# Patient Record
Sex: FEMALE | Race: WHITE | NOT HISPANIC OR LATINO | Employment: OTHER | URBAN - METROPOLITAN AREA
[De-identification: names, ages, dates, MRNs, and addresses within clinical notes are randomized per-mention and may not be internally consistent; named-entity substitution may affect disease eponyms.]

---

## 2023-09-05 PROBLEM — R26.9 GAIT ABNORMALITY: Status: ACTIVE | Noted: 2023-09-05

## 2023-09-05 PROBLEM — R29.898 DECREASED STRENGTH INVOLVING KNEE JOINT: Status: ACTIVE | Noted: 2023-09-05

## 2023-09-05 PROBLEM — E78.5 HYPERLIPEMIA: Status: ACTIVE | Noted: 2023-09-05

## 2023-09-05 PROBLEM — E03.9 HYPOTHYROIDISM, ADULT: Status: ACTIVE | Noted: 2023-09-05

## 2023-09-05 PROBLEM — D35.2 ADENOMA OF PITUITARY (MULTI): Status: ACTIVE | Noted: 2023-09-05

## 2023-09-05 PROBLEM — R79.89 ELEVATED CORTISOL LEVEL: Status: ACTIVE | Noted: 2023-09-05

## 2023-09-05 RX ORDER — CHOLECALCIFEROL (VITAMIN D3) 50 MCG
5000 TABLET ORAL DAILY
COMMUNITY

## 2023-09-05 RX ORDER — LEVOTHYROXINE SODIUM 25 UG/1
2 TABLET ORAL DAILY
COMMUNITY

## 2023-09-05 RX ORDER — PANTOPRAZOLE SODIUM 40 MG/1
TABLET, DELAYED RELEASE ORAL
COMMUNITY
End: 2023-10-16 | Stop reason: ENTERED-IN-ERROR

## 2023-09-05 RX ORDER — IPRATROPIUM BROMIDE AND ALBUTEROL SULFATE 2.5; .5 MG/3ML; MG/3ML
3 SOLUTION RESPIRATORY (INHALATION) 4 TIMES DAILY PRN
COMMUNITY

## 2023-09-05 RX ORDER — ALBUTEROL SULFATE 90 UG/1
2 AEROSOL, METERED RESPIRATORY (INHALATION) EVERY 4 HOURS PRN
COMMUNITY

## 2023-09-05 RX ORDER — FLUTICASONE FUROATE, UMECLIDINIUM BROMIDE AND VILANTEROL TRIFENATATE 200; 62.5; 25 UG/1; UG/1; UG/1
1 POWDER RESPIRATORY (INHALATION) DAILY
COMMUNITY

## 2023-09-05 RX ORDER — ATORVASTATIN CALCIUM 10 MG/1
1 TABLET, FILM COATED ORAL 2 TIMES WEEKLY
COMMUNITY

## 2023-09-05 RX ORDER — TIOTROPIUM BROMIDE 18 UG/1
1 CAPSULE ORAL; RESPIRATORY (INHALATION) DAILY
COMMUNITY
End: 2023-10-16 | Stop reason: ENTERED-IN-ERROR

## 2023-09-05 RX ORDER — SUMATRIPTAN SUCCINATE 100 MG/1
1 TABLET ORAL DAILY PRN
COMMUNITY
End: 2023-10-16 | Stop reason: ENTERED-IN-ERROR

## 2023-09-05 RX ORDER — SULFAMETHOXAZOLE AND TRIMETHOPRIM 800; 160 MG/1; MG/1
1 TABLET ORAL EVERY 12 HOURS
COMMUNITY
Start: 2020-08-01 | End: 2023-10-16 | Stop reason: ENTERED-IN-ERROR

## 2023-09-05 RX ORDER — PROPRANOLOL/HYDROCHLOROTHIAZID 40 MG-25MG
1 TABLET ORAL DAILY
COMMUNITY
End: 2023-10-16 | Stop reason: ENTERED-IN-ERROR

## 2023-09-05 RX ORDER — FLUTICASONE PROPIONATE AND SALMETEROL 250; 50 UG/1; UG/1
1 POWDER RESPIRATORY (INHALATION) 2 TIMES DAILY
COMMUNITY
End: 2023-10-16 | Stop reason: ENTERED-IN-ERROR

## 2023-09-05 RX ORDER — FUROSEMIDE 40 MG/1
TABLET ORAL
COMMUNITY
End: 2023-10-16 | Stop reason: ENTERED-IN-ERROR

## 2023-09-05 RX ORDER — IBUPROFEN 800 MG/1
1 TABLET ORAL 2 TIMES DAILY PRN
COMMUNITY
End: 2023-10-16 | Stop reason: ENTERED-IN-ERROR

## 2023-09-05 RX ORDER — MONTELUKAST SODIUM 10 MG/1
10 TABLET ORAL
COMMUNITY

## 2023-10-11 ENCOUNTER — OFFICE VISIT (OUTPATIENT)
Dept: ENDOCRINOLOGY | Facility: CLINIC | Age: 78
End: 2023-10-11
Payer: MEDICARE

## 2023-10-11 ENCOUNTER — LAB (OUTPATIENT)
Dept: LAB | Facility: LAB | Age: 78
End: 2023-10-11
Payer: MEDICARE

## 2023-10-11 VITALS
BODY MASS INDEX: 34.66 KG/M2 | HEART RATE: 67 BPM | HEIGHT: 65 IN | SYSTOLIC BLOOD PRESSURE: 120 MMHG | DIASTOLIC BLOOD PRESSURE: 80 MMHG | WEIGHT: 208 LBS

## 2023-10-11 DIAGNOSIS — E03.9 HYPOTHYROIDISM, ADULT: Primary | ICD-10-CM

## 2023-10-11 DIAGNOSIS — D35.2 ADENOMA OF PITUITARY (MULTI): ICD-10-CM

## 2023-10-11 DIAGNOSIS — E03.9 HYPOTHYROIDISM, ADULT: ICD-10-CM

## 2023-10-11 LAB
CORTIS AM PEAK SERPL-MSCNC: 19 UG/DL (ref 5–20)
ESTRADIOL SERPL-MCNC: <19 PG/ML
FSH SERPL-ACNC: 55.2 IU/L
LH SERPL-ACNC: 23.5 IU/L
PROLACTIN SERPL-MCNC: 5.3 UG/L (ref 3–20)
T4 FREE SERPL-MCNC: 1.03 NG/DL (ref 0.61–1.12)
TSH SERPL-ACNC: 3.56 MIU/L (ref 0.44–3.98)

## 2023-10-11 PROCEDURE — 83001 ASSAY OF GONADOTROPIN (FSH): CPT

## 2023-10-11 PROCEDURE — 99214 OFFICE O/P EST MOD 30 MIN: CPT | Performed by: INTERNAL MEDICINE

## 2023-10-11 PROCEDURE — 36415 COLL VENOUS BLD VENIPUNCTURE: CPT

## 2023-10-11 PROCEDURE — 84443 ASSAY THYROID STIM HORMONE: CPT

## 2023-10-11 PROCEDURE — 84439 ASSAY OF FREE THYROXINE: CPT

## 2023-10-11 PROCEDURE — 82024 ASSAY OF ACTH: CPT

## 2023-10-11 PROCEDURE — 1159F MED LIST DOCD IN RCRD: CPT | Performed by: INTERNAL MEDICINE

## 2023-10-11 PROCEDURE — 84305 ASSAY OF SOMATOMEDIN: CPT

## 2023-10-11 PROCEDURE — 82670 ASSAY OF TOTAL ESTRADIOL: CPT

## 2023-10-11 PROCEDURE — 83002 ASSAY OF GONADOTROPIN (LH): CPT

## 2023-10-11 PROCEDURE — 82533 TOTAL CORTISOL: CPT

## 2023-10-11 PROCEDURE — 1126F AMNT PAIN NOTED NONE PRSNT: CPT | Performed by: INTERNAL MEDICINE

## 2023-10-11 PROCEDURE — 84146 ASSAY OF PROLACTIN: CPT

## 2023-10-11 ASSESSMENT — ENCOUNTER SYMPTOMS
BACK PAIN: 1
ARTHRALGIAS: 1
TREMORS: 0
UNEXPECTED WEIGHT CHANGE: 1
CONSTIPATION: 0
HEADACHES: 0

## 2023-10-11 NOTE — PROGRESS NOTES
Subjective   Ms. Alvarez is a 77-year-old female who presents for follow up for a pituitary adenoma as well as hypothyroidism.     The patient states that she had a pituitary adenoma which was followed for 10 to 20 years. Her previous endocrinologist was Dr. Leblanc in [place].     Her last MRI from March 15, 2019 did not reveal any pituitary adenomas. She previously reports that she had a 6 mm lesion on a previous MRI of the brain.     She did recall seeing a neurologist for which no intervention was planned. She recalls that she was told that she had acromegaly/or was suspected to have acromegaly based on the appearance of her hands.     The patient previously had hypothyroidism and she states that she never felt fatigued even after working 2 jobs. She states that she had radioactive iodine treatment and recalls that in 2001 she developed hair loss, weight gain and fatigue.     Family history:  Sister - total thyroidectomy   Daughter - hypothyroidism      Current Regimen  Levothyroxine 25mcg po daily 4 days per week  Levothyroxine 50mcg po daily 3 days per week      Thyroid function tests were obtained on September 30, 2022 which revealed a TSH of 3.74 and a free T4 of 1.41     Symptoms are as listed below:  Energy - good   Sleep - disrupted by GERD occasionally   Temperature intolerances -  denies  Bowel movements - regular; once daily   Skin changes -turns purple in the cold   Hair changes -hospitalized for three weeks in December 2021 for pneumonia due to COPD; experienced tinea effluvium; no changes currently   Tremors - denies  Palpitations - denies   Weight change - gained; no longer exercises as facility does not take Silver Sneakers and the [place] facility is too busy with students    Headaches - denies  Vision changes- denies      She had back surgery in 2016; still has radiculopathy  She underwent a hysterectomy at age 30  She underwent bilateral oophorectomy in 2016     She has had no major changes to her  "health since her last appointment.  Her son  one month ago unexpedectedly in his sleep.        Review of Systems   Constitutional:  Positive for unexpected weight change.   Respiratory:  Apnea: Gained 1 pound compared to one year ago.    Gastrointestinal:  Negative for constipation.   Musculoskeletal:  Positive for arthralgias (Knee pain) and back pain.   Neurological:  Negative for tremors and headaches.         Objective   /80 (BP Location: Left arm, Patient Position: Sitting, BP Cuff Size: Adult)   Pulse 67   Ht 1.638 m (5' 4.5\")   Wt 94.3 kg (208 lb)   BMI 35.15 kg/m²    Physical Exam  Constitutional:       General: She is not in acute distress.     Appearance: Normal appearance. She is obese.   HENT:      Head: Normocephalic and atraumatic.      Nose: Nose normal.      Mouth/Throat:      Mouth: Mucous membranes are moist.   Eyes:      Extraocular Movements: Extraocular movements intact.   Neck:      Thyroid: No thyroid mass, thyromegaly or thyroid tenderness.   Cardiovascular:      Rate and Rhythm: Normal rate and regular rhythm.   Pulmonary:      Effort: Pulmonary effort is normal.      Breath sounds: Normal breath sounds.   Musculoskeletal:         General: Normal range of motion.   Skin:     General: Skin is warm.   Neurological:      Mental Status: She is alert and oriented to person, place, and time.   Psychiatric:         Mood and Affect: Mood normal.         Lab Results   Component Value Date    TSH 3.77 10/11/2022    FREET4 1.06 10/11/2022       Assessment/Plan   Hypothyroidism, adult  To obtain thyroid levels   To continue Levothyroxine 25mcg po daily 4 days per week  To continue Levothyroxine 50mcg po daily 3 days per week   Take levothyroxine on an empty stomach with water alone, 30-60 minutes before eating or taking other medications, 4 hours before any calcium or iron supplement.         Adenoma of pituitary (CMS/[facility])  To obtain pituitary panel   Most recent MRI from 2019 did not " reveal any abnormalities     For follow up in 1 year

## 2023-10-11 NOTE — ASSESSMENT & PLAN NOTE
To obtain thyroid levels   To continue Levothyroxine 25mcg po daily 4 days per week  To continue Levothyroxine 50mcg po daily 3 days per week   Take levothyroxine on an empty stomach with water alone, 30-60 minutes before eating or taking other medications, 4 hours before any calcium or iron supplement.

## 2023-10-13 LAB — ACTH PLAS-MCNC: 9 PG/ML (ref 7.2–63.3)

## 2023-10-14 LAB
IGF-I SERPL-MCNC: 210 NG/ML (ref 18–206)
IGF-I Z-SCORE SERPL: 1.8

## 2023-10-16 ENCOUNTER — APPOINTMENT (OUTPATIENT)
Dept: RADIOLOGY | Facility: HOSPITAL | Age: 78
DRG: 194 | End: 2023-10-16
Payer: MEDICARE

## 2023-10-16 ENCOUNTER — HOSPITAL ENCOUNTER (OUTPATIENT)
Facility: HOSPITAL | Age: 78
Setting detail: OBSERVATION
Discharge: HOME | DRG: 194 | End: 2023-10-18
Attending: EMERGENCY MEDICINE | Admitting: INTERNAL MEDICINE
Payer: MEDICARE

## 2023-10-16 ENCOUNTER — TELEPHONE (OUTPATIENT)
Dept: ENDOCRINOLOGY | Facility: CLINIC | Age: 78
End: 2023-10-16
Payer: MEDICARE

## 2023-10-16 DIAGNOSIS — J18.9 COMMUNITY ACQUIRED PNEUMONIA OF LEFT LOWER LOBE OF LUNG: ICD-10-CM

## 2023-10-16 DIAGNOSIS — J43.9 PULMONARY EMPHYSEMA, UNSPECIFIED EMPHYSEMA TYPE (MULTI): ICD-10-CM

## 2023-10-16 DIAGNOSIS — J15.9 COMMUNITY ACQUIRED BACTERIAL PNEUMONIA: Primary | ICD-10-CM

## 2023-10-16 LAB
ABO GROUP (TYPE) IN BLOOD: NORMAL
ALBUMIN SERPL BCP-MCNC: 3.6 G/DL (ref 3.4–5)
ALP SERPL-CCNC: 115 U/L (ref 33–136)
ALT SERPL W P-5'-P-CCNC: 19 U/L (ref 7–45)
ANION GAP BLDV CALCULATED.4IONS-SCNC: 11 MMOL/L (ref 10–25)
ANION GAP SERPL CALC-SCNC: 14 MMOL/L (ref 10–20)
ANTIBODY SCREEN: NORMAL
AST SERPL W P-5'-P-CCNC: 30 U/L (ref 9–39)
BASE EXCESS BLDV CALC-SCNC: 1.3 MMOL/L (ref -2–3)
BASOPHILS # BLD AUTO: 0.03 X10*3/UL (ref 0–0.1)
BASOPHILS NFR BLD AUTO: 0.3 %
BILIRUB SERPL-MCNC: 0.7 MG/DL (ref 0–1.2)
BNP SERPL-MCNC: 59 PG/ML (ref 0–99)
BODY TEMPERATURE: 37 DEGREES CELSIUS
BUN SERPL-MCNC: 15 MG/DL (ref 6–23)
CA-I BLDV-SCNC: 1.15 MMOL/L (ref 1.1–1.33)
CALCIUM SERPL-MCNC: 9.1 MG/DL (ref 8.6–10.3)
CARDIAC TROPONIN I PNL SERPL HS: 10 NG/L (ref 0–13)
CARDIAC TROPONIN I PNL SERPL HS: 9 NG/L (ref 0–13)
CHLORIDE BLDV-SCNC: 104 MMOL/L (ref 98–107)
CHLORIDE SERPL-SCNC: 105 MMOL/L (ref 98–107)
CO2 SERPL-SCNC: 24 MMOL/L (ref 21–32)
CREAT SERPL-MCNC: 0.89 MG/DL (ref 0.5–1.05)
D DIMER PPP FEU-MCNC: 1646 NG/ML FEU
EOSINOPHIL # BLD AUTO: 0.08 X10*3/UL (ref 0–0.4)
EOSINOPHIL NFR BLD AUTO: 0.9 %
ERYTHROCYTE [DISTWIDTH] IN BLOOD BY AUTOMATED COUNT: 13.2 % (ref 11.5–14.5)
GFR SERPL CREATININE-BSD FRML MDRD: 66 ML/MIN/1.73M*2
GLUCOSE BLDV-MCNC: 146 MG/DL (ref 74–99)
GLUCOSE SERPL-MCNC: 137 MG/DL (ref 74–99)
HCO3 BLDV-SCNC: 26.6 MMOL/L (ref 22–26)
HCT VFR BLD AUTO: 37.5 % (ref 36–46)
HCT VFR BLD EST: 37 % (ref 36–46)
HGB BLD-MCNC: 12.8 G/DL (ref 12–16)
HGB BLDV-MCNC: 12.4 G/DL (ref 12–16)
IMM GRANULOCYTES # BLD AUTO: 0.07 X10*3/UL (ref 0–0.5)
IMM GRANULOCYTES NFR BLD AUTO: 0.8 % (ref 0–0.9)
INHALED O2 CONCENTRATION: 21 %
INR PPP: 1.1 (ref 0.9–1.1)
LACTATE BLDV-SCNC: 1.3 MMOL/L (ref 0.4–2)
LACTATE SERPL-SCNC: 0.7 MMOL/L (ref 0.4–2)
LYMPHOCYTES # BLD AUTO: 0.95 X10*3/UL (ref 0.8–3)
LYMPHOCYTES NFR BLD AUTO: 10.7 %
MAGNESIUM SERPL-MCNC: 2.08 MG/DL (ref 1.6–2.4)
MCH RBC QN AUTO: 31.8 PG (ref 26–34)
MCHC RBC AUTO-ENTMCNC: 34.1 G/DL (ref 32–36)
MCV RBC AUTO: 93 FL (ref 80–100)
MONOCYTES # BLD AUTO: 0.8 X10*3/UL (ref 0.05–0.8)
MONOCYTES NFR BLD AUTO: 9 %
NEUTROPHILS # BLD AUTO: 6.93 X10*3/UL (ref 1.6–5.5)
NEUTROPHILS NFR BLD AUTO: 78.3 %
NRBC BLD-RTO: 0 /100 WBCS (ref 0–0)
OXYHGB MFR BLDV: 47.6 % (ref 45–75)
PCO2 BLDV: 44 MM HG (ref 41–51)
PH BLDV: 7.39 PH (ref 7.33–7.43)
PLATELET # BLD AUTO: 268 X10*3/UL (ref 150–450)
PMV BLD AUTO: 11 FL (ref 7.5–11.5)
PO2 BLDV: 33 MM HG (ref 35–45)
POTASSIUM BLDV-SCNC: 3.9 MMOL/L (ref 3.5–5.3)
POTASSIUM SERPL-SCNC: 5.2 MMOL/L (ref 3.5–5.3)
PROT SERPL-MCNC: 6.8 G/DL (ref 6.4–8.2)
PROTHROMBIN TIME: 12.2 SECONDS (ref 9.8–12.8)
RBC # BLD AUTO: 4.02 X10*6/UL (ref 4–5.2)
RH FACTOR (ANTIGEN D): NORMAL
SAO2 % BLDV: 48 % (ref 45–75)
SODIUM BLDV-SCNC: 138 MMOL/L (ref 136–145)
SODIUM SERPL-SCNC: 138 MMOL/L (ref 136–145)
WBC # BLD AUTO: 8.9 X10*3/UL (ref 4.4–11.3)

## 2023-10-16 PROCEDURE — 86900 BLOOD TYPING SEROLOGIC ABO: CPT | Performed by: INTERNAL MEDICINE

## 2023-10-16 PROCEDURE — 2550000001 HC RX 255 CONTRASTS: Performed by: EMERGENCY MEDICINE

## 2023-10-16 PROCEDURE — 2500000001 HC RX 250 WO HCPCS SELF ADMINISTERED DRUGS (ALT 637 FOR MEDICARE OP): Performed by: PHYSICIAN ASSISTANT

## 2023-10-16 PROCEDURE — 71275 CT ANGIOGRAPHY CHEST: CPT | Mod: MG

## 2023-10-16 PROCEDURE — 83880 ASSAY OF NATRIURETIC PEPTIDE: CPT | Performed by: EMERGENCY MEDICINE

## 2023-10-16 PROCEDURE — 99222 1ST HOSP IP/OBS MODERATE 55: CPT | Performed by: INTERNAL MEDICINE

## 2023-10-16 PROCEDURE — 85379 FIBRIN DEGRADATION QUANT: CPT | Performed by: EMERGENCY MEDICINE

## 2023-10-16 PROCEDURE — 2500000004 HC RX 250 GENERAL PHARMACY W/ HCPCS (ALT 636 FOR OP/ED): Performed by: EMERGENCY MEDICINE

## 2023-10-16 PROCEDURE — 86850 RBC ANTIBODY SCREEN: CPT | Performed by: INTERNAL MEDICINE

## 2023-10-16 PROCEDURE — G0378 HOSPITAL OBSERVATION PER HR: HCPCS

## 2023-10-16 PROCEDURE — 84484 ASSAY OF TROPONIN QUANT: CPT | Performed by: EMERGENCY MEDICINE

## 2023-10-16 PROCEDURE — 1210000001 HC SEMI-PRIVATE ROOM DAILY

## 2023-10-16 PROCEDURE — 84132 ASSAY OF SERUM POTASSIUM: CPT | Mod: 59 | Performed by: EMERGENCY MEDICINE

## 2023-10-16 PROCEDURE — 83605 ASSAY OF LACTIC ACID: CPT | Mod: 59 | Performed by: EMERGENCY MEDICINE

## 2023-10-16 PROCEDURE — 2500000001 HC RX 250 WO HCPCS SELF ADMINISTERED DRUGS (ALT 637 FOR MEDICARE OP): Performed by: INTERNAL MEDICINE

## 2023-10-16 PROCEDURE — 87040 BLOOD CULTURE FOR BACTERIA: CPT | Mod: 59,CMCLAB,PORLAB | Performed by: EMERGENCY MEDICINE

## 2023-10-16 PROCEDURE — 85018 HEMOGLOBIN: CPT | Mod: 59 | Performed by: EMERGENCY MEDICINE

## 2023-10-16 PROCEDURE — 71045 X-RAY EXAM CHEST 1 VIEW: CPT | Mod: FY,FR

## 2023-10-16 PROCEDURE — 71275 CT ANGIOGRAPHY CHEST: CPT | Mod: FOREIGN READ | Performed by: RADIOLOGY

## 2023-10-16 PROCEDURE — 85025 COMPLETE CBC W/AUTO DIFF WBC: CPT | Performed by: EMERGENCY MEDICINE

## 2023-10-16 PROCEDURE — 82947 ASSAY GLUCOSE BLOOD QUANT: CPT | Mod: 59 | Performed by: EMERGENCY MEDICINE

## 2023-10-16 PROCEDURE — 85610 PROTHROMBIN TIME: CPT | Performed by: EMERGENCY MEDICINE

## 2023-10-16 PROCEDURE — 84295 ASSAY OF SERUM SODIUM: CPT | Mod: 59 | Performed by: EMERGENCY MEDICINE

## 2023-10-16 PROCEDURE — 87075 CULTR BACTERIA EXCEPT BLOOD: CPT | Mod: CMCLAB,PORLAB,59 | Performed by: EMERGENCY MEDICINE

## 2023-10-16 PROCEDURE — 36415 COLL VENOUS BLD VENIPUNCTURE: CPT | Performed by: EMERGENCY MEDICINE

## 2023-10-16 PROCEDURE — 71045 X-RAY EXAM CHEST 1 VIEW: CPT | Mod: FOREIGN READ | Performed by: RADIOLOGY

## 2023-10-16 PROCEDURE — 2500000004 HC RX 250 GENERAL PHARMACY W/ HCPCS (ALT 636 FOR OP/ED): Performed by: INTERNAL MEDICINE

## 2023-10-16 PROCEDURE — 83735 ASSAY OF MAGNESIUM: CPT | Performed by: EMERGENCY MEDICINE

## 2023-10-16 PROCEDURE — 99285 EMERGENCY DEPT VISIT HI MDM: CPT | Performed by: EMERGENCY MEDICINE

## 2023-10-16 PROCEDURE — 87040 BLOOD CULTURE FOR BACTERIA: CPT | Mod: CMCLAB,PORLAB | Performed by: EMERGENCY MEDICINE

## 2023-10-16 PROCEDURE — 84484 ASSAY OF TROPONIN QUANT: CPT | Mod: 59 | Performed by: EMERGENCY MEDICINE

## 2023-10-16 RX ORDER — ACETAMINOPHEN 160 MG/5ML
650 SOLUTION ORAL EVERY 4 HOURS PRN
Status: DISCONTINUED | OUTPATIENT
Start: 2023-10-16 | End: 2023-10-18 | Stop reason: HOSPADM

## 2023-10-16 RX ORDER — FUROSEMIDE 40 MG/1
40 TABLET ORAL DAILY
Status: DISCONTINUED | OUTPATIENT
Start: 2023-10-16 | End: 2023-10-18 | Stop reason: HOSPADM

## 2023-10-16 RX ORDER — NITROGLYCERIN 0.4 MG/1
0.4 TABLET SUBLINGUAL EVERY 5 MIN PRN
Status: DISCONTINUED | OUTPATIENT
Start: 2023-10-16 | End: 2023-10-16

## 2023-10-16 RX ORDER — IBUPROFEN 600 MG/1
600 TABLET ORAL ONCE
Status: COMPLETED | OUTPATIENT
Start: 2023-10-16 | End: 2023-10-16

## 2023-10-16 RX ORDER — IPRATROPIUM BROMIDE AND ALBUTEROL SULFATE 2.5; .5 MG/3ML; MG/3ML
3 SOLUTION RESPIRATORY (INHALATION)
Status: DISCONTINUED | OUTPATIENT
Start: 2023-10-16 | End: 2023-10-16

## 2023-10-16 RX ORDER — BISACODYL 5 MG
10 TABLET, DELAYED RELEASE (ENTERIC COATED) ORAL DAILY PRN
Status: DISCONTINUED | OUTPATIENT
Start: 2023-10-16 | End: 2023-10-18 | Stop reason: HOSPADM

## 2023-10-16 RX ORDER — CEFTRIAXONE 2 G/50ML
2 INJECTION, SOLUTION INTRAVENOUS EVERY 24 HOURS
Status: DISCONTINUED | OUTPATIENT
Start: 2023-10-17 | End: 2023-10-18 | Stop reason: HOSPADM

## 2023-10-16 RX ORDER — PANTOPRAZOLE SODIUM 40 MG/1
40 TABLET, DELAYED RELEASE ORAL
Status: DISCONTINUED | OUTPATIENT
Start: 2023-10-17 | End: 2023-10-18 | Stop reason: HOSPADM

## 2023-10-16 RX ORDER — LEVOTHYROXINE SODIUM 25 UG/1
25 TABLET ORAL
COMMUNITY

## 2023-10-16 RX ORDER — GUAIFENESIN 600 MG/1
1200 TABLET, EXTENDED RELEASE ORAL 2 TIMES DAILY
Status: DISCONTINUED | OUTPATIENT
Start: 2023-10-16 | End: 2023-10-18 | Stop reason: HOSPADM

## 2023-10-16 RX ORDER — MORPHINE SULFATE 4 MG/ML
4 INJECTION, SOLUTION INTRAMUSCULAR; INTRAVENOUS ONCE
Status: COMPLETED | OUTPATIENT
Start: 2023-10-16 | End: 2023-10-16

## 2023-10-16 RX ORDER — DEXAMETHASONE SODIUM PHOSPHATE 4 MG/ML
3 INJECTION, SOLUTION INTRA-ARTICULAR; INTRALESIONAL; INTRAMUSCULAR; INTRAVENOUS; SOFT TISSUE EVERY 8 HOURS
Status: DISCONTINUED | OUTPATIENT
Start: 2023-10-16 | End: 2023-10-18 | Stop reason: HOSPADM

## 2023-10-16 RX ORDER — LEVOTHYROXINE SODIUM 25 UG/1
25 TABLET ORAL
Status: DISCONTINUED | OUTPATIENT
Start: 2023-10-17 | End: 2023-10-17

## 2023-10-16 RX ORDER — CEFTRIAXONE 2 G/50ML
2 INJECTION, SOLUTION INTRAVENOUS ONCE
Status: COMPLETED | OUTPATIENT
Start: 2023-10-16 | End: 2023-10-16

## 2023-10-16 RX ORDER — ONDANSETRON HYDROCHLORIDE 2 MG/ML
4 INJECTION, SOLUTION INTRAVENOUS EVERY 4 HOURS PRN
Status: DISCONTINUED | OUTPATIENT
Start: 2023-10-16 | End: 2023-10-18 | Stop reason: HOSPADM

## 2023-10-16 RX ORDER — LEVOTHYROXINE SODIUM 50 UG/1
50 TABLET ORAL
Status: DISCONTINUED | OUTPATIENT
Start: 2023-10-16 | End: 2023-10-16

## 2023-10-16 RX ORDER — ACETAMINOPHEN 500 MG
5000 TABLET ORAL DAILY
Status: DISCONTINUED | OUTPATIENT
Start: 2023-10-16 | End: 2023-10-18 | Stop reason: HOSPADM

## 2023-10-16 RX ORDER — FLUTICASONE FUROATE AND VILANTEROL 200; 25 UG/1; UG/1
1 POWDER RESPIRATORY (INHALATION)
Status: DISCONTINUED | OUTPATIENT
Start: 2023-10-16 | End: 2023-10-16 | Stop reason: SDUPTHER

## 2023-10-16 RX ORDER — IPRATROPIUM BROMIDE AND ALBUTEROL SULFATE 2.5; .5 MG/3ML; MG/3ML
3 SOLUTION RESPIRATORY (INHALATION) 3 TIMES DAILY PRN
Status: DISCONTINUED | OUTPATIENT
Start: 2023-10-16 | End: 2023-10-18 | Stop reason: HOSPADM

## 2023-10-16 RX ORDER — FLUTICASONE FUROATE AND VILANTEROL 200; 25 UG/1; UG/1
1 POWDER RESPIRATORY (INHALATION)
Status: DISCONTINUED | OUTPATIENT
Start: 2023-10-16 | End: 2023-10-18 | Stop reason: HOSPADM

## 2023-10-16 RX ORDER — SUMATRIPTAN SUCCINATE 25 MG/1
100 TABLET ORAL DAILY PRN
Status: DISCONTINUED | OUTPATIENT
Start: 2023-10-16 | End: 2023-10-18 | Stop reason: HOSPADM

## 2023-10-16 RX ORDER — ATORVASTATIN CALCIUM 10 MG/1
10 TABLET, FILM COATED ORAL 2 TIMES WEEKLY
Status: DISCONTINUED | OUTPATIENT
Start: 2023-10-16 | End: 2023-10-18 | Stop reason: HOSPADM

## 2023-10-16 RX ORDER — ACETAMINOPHEN 650 MG/1
650 SUPPOSITORY RECTAL EVERY 4 HOURS PRN
Status: DISCONTINUED | OUTPATIENT
Start: 2023-10-16 | End: 2023-10-18 | Stop reason: HOSPADM

## 2023-10-16 RX ORDER — ACETAMINOPHEN 325 MG/1
650 TABLET ORAL EVERY 4 HOURS PRN
Status: DISCONTINUED | OUTPATIENT
Start: 2023-10-16 | End: 2023-10-18 | Stop reason: HOSPADM

## 2023-10-16 RX ADMIN — CEFTRIAXONE SODIUM 2 G: 2 INJECTION, SOLUTION INTRAVENOUS at 08:33

## 2023-10-16 RX ADMIN — Medication: at 15:37

## 2023-10-16 RX ADMIN — IOHEXOL 75 ML: 350 INJECTION, SOLUTION INTRAVENOUS at 09:56

## 2023-10-16 RX ADMIN — MORPHINE SULFATE 4 MG: 4 INJECTION, SOLUTION INTRAMUSCULAR; INTRAVENOUS at 07:48

## 2023-10-16 RX ADMIN — IBUPROFEN 600 MG: 600 TABLET, FILM COATED ORAL at 23:18

## 2023-10-16 RX ADMIN — DEXAMETHASONE SODIUM PHOSPHATE 3.2 MG: 4 INJECTION, SOLUTION INTRAMUSCULAR; INTRAVENOUS at 17:05

## 2023-10-16 RX ADMIN — CHOLECALCIFEROL TAB 125 MCG (5000 UNIT) 5000 UNITS: 125 TAB at 17:05

## 2023-10-16 RX ADMIN — AZITHROMYCIN MONOHYDRATE 500 MG: 500 INJECTION, POWDER, LYOPHILIZED, FOR SOLUTION INTRAVENOUS at 09:35

## 2023-10-16 RX ADMIN — ATORVASTATIN CALCIUM 10 MG: 10 TABLET, FILM COATED ORAL at 17:05

## 2023-10-16 SDOH — SOCIAL STABILITY: SOCIAL INSECURITY: HAVE YOU HAD THOUGHTS OF HARMING ANYONE ELSE?: NO

## 2023-10-16 SDOH — SOCIAL STABILITY: SOCIAL INSECURITY: DO YOU FEEL ANYONE HAS EXPLOITED OR TAKEN ADVANTAGE OF YOU FINANCIALLY OR OF YOUR PERSONAL PROPERTY?: NO

## 2023-10-16 SDOH — SOCIAL STABILITY: SOCIAL INSECURITY: HAS ANYONE EVER THREATENED TO HURT YOUR FAMILY OR YOUR PETS?: NO

## 2023-10-16 SDOH — SOCIAL STABILITY: SOCIAL INSECURITY: ABUSE: ADULT

## 2023-10-16 SDOH — SOCIAL STABILITY: SOCIAL INSECURITY: DO YOU FEEL UNSAFE GOING BACK TO THE PLACE WHERE YOU ARE LIVING?: NO

## 2023-10-16 SDOH — SOCIAL STABILITY: SOCIAL INSECURITY: ARE YOU OR HAVE YOU BEEN THREATENED OR ABUSED PHYSICALLY, EMOTIONALLY, OR SEXUALLY BY ANYONE?: NO

## 2023-10-16 SDOH — SOCIAL STABILITY: SOCIAL INSECURITY: ARE THERE ANY APPARENT SIGNS OF INJURIES/BEHAVIORS THAT COULD BE RELATED TO ABUSE/NEGLECT?: NO

## 2023-10-16 SDOH — SOCIAL STABILITY: SOCIAL INSECURITY: DOES ANYONE TRY TO KEEP YOU FROM HAVING/CONTACTING OTHER FRIENDS OR DOING THINGS OUTSIDE YOUR HOME?: NO

## 2023-10-16 ASSESSMENT — PATIENT HEALTH QUESTIONNAIRE - PHQ9
1. LITTLE INTEREST OR PLEASURE IN DOING THINGS: NOT AT ALL
2. FEELING DOWN, DEPRESSED OR HOPELESS: NOT AT ALL
SUM OF ALL RESPONSES TO PHQ9 QUESTIONS 1 & 2: 0

## 2023-10-16 ASSESSMENT — ENCOUNTER SYMPTOMS
CHEST TIGHTNESS: 1
RHINORRHEA: 0
GASTROINTESTINAL NEGATIVE: 1
ACTIVITY CHANGE: 1
MYALGIAS: 1
PSYCHIATRIC NEGATIVE: 1
PALPITATIONS: 0
ARTHRALGIAS: 1
APPETITE CHANGE: 1
FLANK PAIN: 1
STRIDOR: 0
COUGH: 0
WHEEZING: 0
NECK STIFFNESS: 1
SHORTNESS OF BREATH: 1
EYES NEGATIVE: 1
WEAKNESS: 1

## 2023-10-16 ASSESSMENT — COGNITIVE AND FUNCTIONAL STATUS - GENERAL
DAILY ACTIVITIY SCORE: 24
DAILY ACTIVITIY SCORE: 24
PATIENT BASELINE BEDBOUND: NO
MOBILITY SCORE: 24
MOBILITY SCORE: 24

## 2023-10-16 ASSESSMENT — LIFESTYLE VARIABLES
SKIP TO QUESTIONS 9-10: 1
EVER FELT BAD OR GUILTY ABOUT YOUR DRINKING: NO
HOW OFTEN DO YOU HAVE A DRINK CONTAINING ALCOHOL: NEVER
AUDIT-C TOTAL SCORE: 0
HAVE YOU EVER FELT YOU SHOULD CUT DOWN ON YOUR DRINKING: NO
AUDIT-C TOTAL SCORE: 0
HOW MANY STANDARD DRINKS CONTAINING ALCOHOL DO YOU HAVE ON A TYPICAL DAY: PATIENT DOES NOT DRINK
HAVE PEOPLE ANNOYED YOU BY CRITICIZING YOUR DRINKING: NO
REASON UNABLE TO ASSESS: NO
HOW OFTEN DO YOU HAVE 6 OR MORE DRINKS ON ONE OCCASION: NEVER
EVER HAD A DRINK FIRST THING IN THE MORNING TO STEADY YOUR NERVES TO GET RID OF A HANGOVER: NO

## 2023-10-16 ASSESSMENT — COLUMBIA-SUICIDE SEVERITY RATING SCALE - C-SSRS
6. HAVE YOU EVER DONE ANYTHING, STARTED TO DO ANYTHING, OR PREPARED TO DO ANYTHING TO END YOUR LIFE?: NO
2. HAVE YOU ACTUALLY HAD ANY THOUGHTS OF KILLING YOURSELF?: NO
1. IN THE PAST MONTH, HAVE YOU WISHED YOU WERE DEAD OR WISHED YOU COULD GO TO SLEEP AND NOT WAKE UP?: NO

## 2023-10-16 ASSESSMENT — ACTIVITIES OF DAILY LIVING (ADL)
HEARING - RIGHT EAR: FUNCTIONAL
HEARING - LEFT EAR: FUNCTIONAL
FEEDING YOURSELF: INDEPENDENT
LACK_OF_TRANSPORTATION: NO
TOILETING: INDEPENDENT
GROOMING: INDEPENDENT
WALKS IN HOME: INDEPENDENT
BATHING: INDEPENDENT
JUDGMENT_ADEQUATE_SAFELY_COMPLETE_DAILY_ACTIVITIES: YES
ADEQUATE_TO_COMPLETE_ADL: YES
DRESSING YOURSELF: INDEPENDENT
PATIENT'S MEMORY ADEQUATE TO SAFELY COMPLETE DAILY ACTIVITIES?: YES

## 2023-10-16 ASSESSMENT — PAIN SCALES - GENERAL
PAINLEVEL_OUTOF10: 10 - WORST POSSIBLE PAIN
PAINLEVEL_OUTOF10: 8
PAINLEVEL_OUTOF10: 5 - MODERATE PAIN
PAINLEVEL_OUTOF10: 10 - WORST POSSIBLE PAIN

## 2023-10-16 ASSESSMENT — PAIN - FUNCTIONAL ASSESSMENT: PAIN_FUNCTIONAL_ASSESSMENT: 0-10

## 2023-10-16 NOTE — TELEPHONE ENCOUNTER
----- Message from Erlinda Law MD sent at 10/16/2023  7:51 AM EDT -----  Labs have been reviewed.  All values look good.  Please continue the same dose of Levothyroxine and follow up as scheduled.

## 2023-10-16 NOTE — CARE PLAN
Problem: Pain  Goal: Takes deep breaths with improved pain control throughout the shift  Outcome: Progressing  Goal: Turns in bed with improved pain control throughout the shift  Outcome: Progressing  Goal: Walks with improved pain control throughout the shift  Outcome: Progressing  Goal: Performs ADL's with improved pain control throughout shift  Outcome: Progressing  Goal: Participates in PT with improved pain control throughout the shift  Outcome: Progressing  Goal: Free from opioid side effects throughout the shift  Outcome: Progressing  Goal: Free from acute confusion related to pain meds throughout the shift  Outcome: Progressing   The patient's goals for the shift include      The clinical goals for the shift include chest pain managed

## 2023-10-16 NOTE — H&P
History Of Present Illness  Yaneth Alvarez is a 77 y.o. female patient of Dr. Green who has a history of emphysema decreasing lung volumes worsening kyphosis who developed acute malaise aches and pains over a 24-hour.  Followed by left lower chest pain with pleuritic component and malaise.  Patient began to feel worse to the point that she was concerned regarding the chest pain so was brought in by EMS after t she called because of the concerning pain that she was having.  Evaluation in the ER is significant for a significant left lower lung infiltrate consistent with community-acquired pneumonia.  She is hypoxic with ambulation and with pleuritic chest pain so was brought in for treatment.  She has been initiated on community-acquired pneumonia protocol with Zithromax and Rocephin.  Did go over her CAT scan with her and her daughter at bedside this evening.  Patient had a recent CAT scan on in August that did not demonstrate any nodule or infiltrate there.  Clinically with antibiotics and treatment she has received so far she is beginning to feel better..     Past Medical History  Past Medical History:   Diagnosis Date    Acute embolism and thrombosis of unspecified deep veins of unspecified lower extremity (CMS/HCC)     Acute deep vein thrombosis (DVT)    Acute respiratory failure with hypoxia (CMS/HCC)     Acute respiratory failure with hypoxemia    Age-related nuclear cataract, unspecified eye     Nuclear senile cataract    Chronic obstructive pulmonary disease, unspecified (CMS/HCC)     COPD, moderate    COVID-19     COVID-19    Migraine, unspecified, not intractable, without status migrainosus     Migraine    Nontoxic multinodular goiter     Nontoxic multinodular goiter    Personal history of colonic polyps     History of colonic polyps    Personal history of malignant neoplasm of other parts of uterus     History of malignant neoplasm of uterus    Personal history of other benign neoplasm     History of  pituitary adenoma    Personal history of other diseases of the circulatory system     History of congestive heart failure    Personal history of other diseases of the circulatory system     History of pulmonary hypertension    Personal history of other diseases of the circulatory system     History of varicose veins of lower extremity    Personal history of other diseases of the respiratory system     History of allergic rhinitis    Personal history of other diseases of the respiratory system     History of asthma    Personal history of other endocrine, nutritional and metabolic disease     History of acidosis    Personal history of other endocrine, nutritional and metabolic disease     History of vitamin D deficiency    Personal history of other endocrine, nutritional and metabolic disease     History of familial combined hyperlipidemia    Personal history of other mental and behavioral disorders     History of anxiety    Personal history of other specified conditions     H/O multiple pulmonary nodules    Personal history of other specified conditions     History of gait disorder    Personal history of pneumonia (recurrent)     History of pneumonia    Solitary pulmonary nodule     Pulmonary nodule    Spondylosis without myelopathy or radiculopathy, lumbar region     Spondylosis of lumbar spine    Unilateral primary osteoarthritis, left knee     Arthritis of left knee    Unilateral primary osteoarthritis, right knee     Arthritis of right knee    Unspecified osteoarthritis, unspecified site     Osteoarthritis       Surgical History  Past Surgical History:   Procedure Laterality Date    OTHER SURGICAL HISTORY  10/07/2022    Knee replacement    OTHER SURGICAL HISTORY  10/07/2022    Facet joint injection    OTHER SURGICAL HISTORY  10/07/2022    Sigmoidoscopy    OTHER SURGICAL HISTORY  10/07/2022    Colonoscopy        Social History  She reports that she has quit smoking. Her smoking use included cigarettes. She has  "never used smokeless tobacco. She reports that she does not currently use alcohol. No history on file for drug use.    Family History  No family history on file.     Allergies  Patient has no known allergies.    Review of Systems   Constitutional:  Positive for activity change and appetite change.   HENT:  Positive for congestion. Negative for dental problem, drooling, ear discharge, ear pain and rhinorrhea.    Eyes: Negative.    Respiratory:  Positive for chest tightness and shortness of breath. Negative for cough, wheezing and stridor.    Cardiovascular:  Positive for leg swelling. Negative for palpitations.   Gastrointestinal: Negative.    Endocrine: Positive for cold intolerance.   Genitourinary:  Positive for flank pain.   Musculoskeletal:  Positive for arthralgias, myalgias and neck stiffness.   Skin:  Positive for pallor.   Neurological:  Positive for weakness.   Psychiatric/Behavioral: Negative.          Physical Exam     Last Recorded Vitals  Blood pressure 131/75, pulse 105, temperature 37.2 °C (98.9 °F), temperature source Temporal, resp. rate 19, height 1.626 m (5' 4\"), weight 94.1 kg (207 lb 7.3 oz), SpO2 92 %.    Relevant Results    Scheduled medications  atorvastatin, 10 mg, oral, Once per day on Mon Thu  [START ON 10/17/2023] azithromycin (Zithromax) 500 mg in dextrose 5 % 250 mL IV, 500 mg, intravenous, q24h  [START ON 10/17/2023] cefTRIAXone, 2 g, intravenous, q24h  cholecalciferol, 5,000 Units, oral, Daily  dexAMETHasone, 3.2 mg, intravenous, q8h  tiotropium, 2 Inhalation, inhalation, Daily   And  fluticasone furoate-vilanteroL, 1 puff, inhalation, Daily  furosemide, 40 mg, oral, Daily  guaiFENesin, 1,200 mg, oral, BID  [START ON 10/17/2023] levothyroxine, 25 mcg, oral, Every Sun/Tues/Thur/Sat  [START ON 10/17/2023] pantoprazole, 40 mg, oral, Daily before breakfast      Continuous medications     PRN medications  PRN medications: ipratropium-albuteroL, oxygen, SUMAtriptan    Patient's CAT scan " demonstrates Extensive areas of consolidation in the left lower lobe and inferior lingula compared to 8/8/2023 compatible with pneumonia.  They do recommend follow-up short-term CAT scan after treatment to make sure that there has been resolution.  There is also a small left pleural effusion.  There is a small stable 9 mm nodule in the right lower lobe.  Follow-up CAT scan for that is recommended as well.  She has a small hiatal hernia right adrenal adenoma and mild coronary calcification with lungs demonstrating emphysema unknown condition.  .     Assessment/Plan   Principal Problem:    Community acquired pneumonia  Active Problems:    Emphysema lung (CMS/HCC)    Decreased strength involving knee joint    Gait abnormality    Hypothyroidism, adult    Hyperlipemia    Elevated cortisol level    Adenoma of pituitary (CMS/HCC)    Community acquired bacterial pneumonia      Community-acquired pneumonia protocol  Rocephin and azithromycin  Humibid  Aerosols  IV Decadron  Comfort medications  Urinary pneumococcal and Legionella antigen  Sputum culture  Blood cultures  Discharge planning will be home  Check labs in a.m.  See orders for complete plan         I spent 50 minutes in the professional and overall care of this patient.      Anival Langston MD

## 2023-10-16 NOTE — ED PROVIDER NOTES
HPI   No chief complaint on file.      HISTORY OF PRESENT ILLNESS:  Patient is a 77-year-old female with history of COPD, chronic heart failure with diastolic dysfunction who presents the emergency department by EMS for chest pain.  Patient states that starting at 1 AM, she woke up with sudden onset chest pain as pleuritic in nature and feels like a chest pressure that is nonradiating.  She is never had chest pain like this before.  The pain was consistent and nonexertional.  The patient denied any fever, chills, cough.  She ultimately called 911 when symptoms did not resolve.  Patient states that she had received 2 nitroglycerin by EMS and had no improvement of symptoms.  No leg swelling, prior DVT or PE.    .  Past Medical History: History of COPD, chronic CHF with diastolic dysfunction, pulmonary hypertension, right lung nodule  Past Surgical History: Knee surgery, appendectomy, back surgery  Family History: family history not pertinent to presenting problem or chief complaint  Social History: Denied current cigarette smoking    __________________________________________________________  PHYSICAL EXAM:    Appearance: Alert, oriented , cooperative   Skin: Intact,  dry skin, no lesions, rash, petechiae or purpura.   Eyes: PERRLA, EOMs intact,  Conjunctiva pink with no redness or exudates.    HENT: Normocephalic, atraumatic. Nares patent   Neck: Supple. Trachea at midline.   Pulmonary: Lung sounds are clear bilaterally.  There is no rales, rhonchi, or wheezing.  Cardiac: Regular rate and rhythm, no rubs, murmurs, or gallops. No JVD,   Abdomen: Abdomen is soft, nontender, and nondistended.  No palpable organomegaly.  No rebound or guarding.  No CVA tenderness. Nonsurgical abdomen  Genitourinary: Exam deferred.  Musculoskeletal: no edema, pain, cyanosis, or deformity in extremities. Pulses full and equal.   Neurological:  Cranial nerves are grossly intact, grossly normal sensation, no weakness, no focal findings  identified.    __________________________________________________________  MEDICAL DECISION MAKING:    Patient was seen and examined. Differential diagnosis for chest pain and shortness of breath includes COPD exacerbation, pneumonia, pneumothorax, pulmonary embolism.  Patient woke up with sudden pleuritic chest pain.  This is concerning for possible pulmonary embolism.  However, patient does not have any risk factors for pulmonary embolism.  I initiated a work-up.  Did not hear any wheezing.  Patient oxygen level was  holding at 94% without need of room air.  As noted in the medical decision making, chest x-ray appears to have a pneumonia.  Patient started antibiotics.  Given the elevated D-dimer, CT angio was ordered.  Patient CT angio was negative for a pulmonary embolism.  Did confirm a multifocal pneumonia in addition to a small left-sided pleural effusion.  There is also a lung nodule on the right side.  I discussed with the patient.  They felt comfortable with potentially going home.  However, patient ambulating pulse ox desatted to 88% when patient normally is 94% and above.  They are agreeable with admission.  Case discussed with IMS who agreed to have the patient admitted for community-acquired pneumonia    Patient last echocardiogram from December 9, 2021 was reviewed with EF of 6065%, upper interventricular septal thickening.    Chronic Medical Conditions Significantly Affecting Care: COPD, pulmonary hypertension    External Records Reviewed: I reviewed recent and relevant outside records including: Pulmon[facility] August 14, 2023    [facility]  [facility]                          No data recorded                Patient History   Past Medical History:   Diagnosis Date    Acute embolism and thrombosis of unspecified deep veins of unspecified lower extremity (CMS/HCC)     Acute deep vein thrombosis (DVT)    Acute respiratory failure with hypoxia (CMS/HCC)     Acute respiratory failure with  hypoxemia    Age-related nuclear cataract, unspecified eye     Nuclear senile cataract    Chronic obstructive pulmonary disease, unspecified (CMS/HCC)     COPD, moderate    COVID-19     COVID-19    Migraine, unspecified, not intractable, without status migrainosus     Migraine    Nontoxic multinodular goiter     Nontoxic multinodular goiter    Personal history of colonic polyps     History of colonic polyps    Personal history of malignant neoplasm of other parts of uterus     History of malignant neoplasm of uterus    Personal history of other benign neoplasm     History of pituitary adenoma    Personal history of other diseases of the circulatory system     History of congestive heart failure    Personal history of other diseases of the circulatory system     History of pulmonary hypertension    Personal history of other diseases of the circulatory system     History of varicose veins of lower extremity    Personal history of other diseases of the respiratory system     History of allergic rhinitis    Personal history of other diseases of the respiratory system     History of asthma    Personal history of other endocrine, nutritional and metabolic disease     History of acidosis    Personal history of other endocrine, nutritional and metabolic disease     History of vitamin D deficiency    Personal history of other endocrine, nutritional and metabolic disease     History of familial combined hyperlipidemia    Personal history of other mental and behavioral disorders     History of anxiety    Personal history of other specified conditions     H/O multiple pulmonary nodules    Personal history of other specified conditions     History of gait disorder    Personal history of pneumonia (recurrent)     History of pneumonia    Solitary pulmonary nodule     Pulmonary nodule    Spondylosis without myelopathy or radiculopathy, lumbar region     Spondylosis of lumbar spine    Unilateral primary osteoarthritis, left knee      Arthritis of left knee    Unilateral primary osteoarthritis, right knee     Arthritis of right knee    Unspecified osteoarthritis, unspecified site     Osteoarthritis     Past Surgical History:   Procedure Laterality Date    OTHER SURGICAL HISTORY  10/07/2022    Knee replacement    OTHER SURGICAL HISTORY  10/07/2022    Facet joint injection    OTHER SURGICAL HISTORY  10/07/2022    Sigmoidoscopy    OTHER SURGICAL HISTORY  10/07/2022    Colonoscopy     No family history on file.  Social History     Tobacco Use    Smoking status: Not on file    Smokeless tobacco: Not on file   Substance Use Topics    Alcohol use: Not on file    Drug use: Not on file       Physical Exam   ED Triage Vitals   Temp Pulse Resp BP   -- -- -- --      SpO2 Temp src Heart Rate Source Patient Position   -- -- -- --      BP Location FiO2 (%)     -- --       Physical Exam    ED Course & [facility]   ED Course as of 10/16/23 1121   Mon Oct 16, 2023   0737 Patient twelve-lead EKG interpreted by myself shows sinus rhythm with a ventricular rate of 99, normal NM interval interval, left axis deviation, supraventricular bigeminy present, widened QRS, right bundle branch block present, prolonged QT with a QTc of 500 ms, compared with prior EKG from December 21, 2021 where patient's ST segments are unchanged.  Plan for repeat twelve-lead EKG. [WJ]   0813 XR chest 1 view  I independently reviewed the chest x-ray and noted a left lower lobe pneumonia.  This would explain the patient's chest pain.  Sepsis orders were initiated and patient was started on ceftriaxone and azithromycin. [WJ]   0830 D-Dimer, Quantitative VTE Exclusion(!): 1,646  CT PE angio [WJ]   0919 XR chest 1 view  Radiology confirmed opacity [WJ]   1120 Patient desat to 88% on RA while ambulating. Meets inpatient for CAP [WJ]      ED Course User Index  [WJ] Diomedes Valenzuela DO       Medical Decision Making      Procedure  Procedures     Diomedes Valenzuela DO  10/16/23 0737       Diomedes Valenzuela,  DO  10/16/23 1123

## 2023-10-17 LAB
ANION GAP SERPL CALC-SCNC: 10 MMOL/L (ref 10–20)
APPEARANCE UR: ABNORMAL
BASOPHILS # BLD AUTO: 0.02 X10*3/UL (ref 0–0.1)
BASOPHILS NFR BLD AUTO: 0.3 %
BILIRUB UR STRIP.AUTO-MCNC: NEGATIVE MG/DL
BUN SERPL-MCNC: 15 MG/DL (ref 6–23)
CALCIUM SERPL-MCNC: 9 MG/DL (ref 8.6–10.3)
CHLORIDE SERPL-SCNC: 108 MMOL/L (ref 98–107)
CO2 SERPL-SCNC: 25 MMOL/L (ref 21–32)
COLOR UR: YELLOW
CREAT SERPL-MCNC: 0.72 MG/DL (ref 0.5–1.05)
EOSINOPHIL # BLD AUTO: 0 X10*3/UL (ref 0–0.4)
EOSINOPHIL NFR BLD AUTO: 0 %
ERYTHROCYTE [DISTWIDTH] IN BLOOD BY AUTOMATED COUNT: 12.8 % (ref 11.5–14.5)
GFR SERPL CREATININE-BSD FRML MDRD: 85 ML/MIN/1.73M*2
GLUCOSE SERPL-MCNC: 159 MG/DL (ref 74–99)
GLUCOSE UR STRIP.AUTO-MCNC: NEGATIVE MG/DL
HCT VFR BLD AUTO: 34.8 % (ref 36–46)
HGB BLD-MCNC: 11.9 G/DL (ref 12–16)
HOLD SPECIMEN: NORMAL
IMM GRANULOCYTES # BLD AUTO: 0.08 X10*3/UL (ref 0–0.5)
IMM GRANULOCYTES NFR BLD AUTO: 1.3 % (ref 0–0.9)
KETONES UR STRIP.AUTO-MCNC: NEGATIVE MG/DL
LEUKOCYTE ESTERASE UR QL STRIP.AUTO: NEGATIVE
LYMPHOCYTES # BLD AUTO: 0.62 X10*3/UL (ref 0.8–3)
LYMPHOCYTES NFR BLD AUTO: 9.8 %
MAGNESIUM SERPL-MCNC: 2.2 MG/DL (ref 1.6–2.4)
MCH RBC QN AUTO: 32.4 PG (ref 26–34)
MCHC RBC AUTO-ENTMCNC: 34.2 G/DL (ref 32–36)
MCV RBC AUTO: 95 FL (ref 80–100)
MONOCYTES # BLD AUTO: 0.23 X10*3/UL (ref 0.05–0.8)
MONOCYTES NFR BLD AUTO: 3.6 %
MUCOUS THREADS #/AREA URNS AUTO: NORMAL /LPF
NEUTROPHILS # BLD AUTO: 5.4 X10*3/UL (ref 1.6–5.5)
NEUTROPHILS NFR BLD AUTO: 85 %
NITRITE UR QL STRIP.AUTO: NEGATIVE
NRBC BLD-RTO: 0 /100 WBCS (ref 0–0)
PH UR STRIP.AUTO: 5 [PH]
PLATELET # BLD AUTO: 312 X10*3/UL (ref 150–450)
PMV BLD AUTO: 10.2 FL (ref 7.5–11.5)
POTASSIUM SERPL-SCNC: 4.4 MMOL/L (ref 3.5–5.3)
PROT UR STRIP.AUTO-MCNC: ABNORMAL MG/DL
RBC # BLD AUTO: 3.67 X10*6/UL (ref 4–5.2)
RBC # UR STRIP.AUTO: ABNORMAL /UL
RBC #/AREA URNS AUTO: NORMAL /HPF
SODIUM SERPL-SCNC: 139 MMOL/L (ref 136–145)
SP GR UR STRIP.AUTO: 1.02
SQUAMOUS #/AREA URNS AUTO: NORMAL /HPF
UROBILINOGEN UR STRIP.AUTO-MCNC: <2 MG/DL
WBC # BLD AUTO: 6.4 X10*3/UL (ref 4.4–11.3)
WBC #/AREA URNS AUTO: NORMAL /HPF

## 2023-10-17 PROCEDURE — 87899 AGENT NOS ASSAY W/OPTIC: CPT | Mod: CMCLAB,PORLAB | Performed by: INTERNAL MEDICINE

## 2023-10-17 PROCEDURE — 2500000002 HC RX 250 W HCPCS SELF ADMINISTERED DRUGS (ALT 637 FOR MEDICARE OP, ALT 636 FOR OP/ED): Mod: MUE | Performed by: INTERNAL MEDICINE

## 2023-10-17 PROCEDURE — 99232 SBSQ HOSP IP/OBS MODERATE 35: CPT | Performed by: INTERNAL MEDICINE

## 2023-10-17 PROCEDURE — 80048 BASIC METABOLIC PNL TOTAL CA: CPT | Performed by: INTERNAL MEDICINE

## 2023-10-17 PROCEDURE — 85025 COMPLETE CBC W/AUTO DIFF WBC: CPT | Performed by: INTERNAL MEDICINE

## 2023-10-17 PROCEDURE — 83735 ASSAY OF MAGNESIUM: CPT | Performed by: INTERNAL MEDICINE

## 2023-10-17 PROCEDURE — 87449 NOS EACH ORGANISM AG IA: CPT | Mod: CMCLAB,PORLAB | Performed by: INTERNAL MEDICINE

## 2023-10-17 PROCEDURE — 36415 COLL VENOUS BLD VENIPUNCTURE: CPT | Performed by: INTERNAL MEDICINE

## 2023-10-17 PROCEDURE — 1210000001 HC SEMI-PRIVATE ROOM DAILY

## 2023-10-17 PROCEDURE — 94640 AIRWAY INHALATION TREATMENT: CPT

## 2023-10-17 PROCEDURE — 2500000004 HC RX 250 GENERAL PHARMACY W/ HCPCS (ALT 636 FOR OP/ED): Performed by: INTERNAL MEDICINE

## 2023-10-17 PROCEDURE — 81001 URINALYSIS AUTO W/SCOPE: CPT | Performed by: EMERGENCY MEDICINE

## 2023-10-17 PROCEDURE — G0378 HOSPITAL OBSERVATION PER HR: HCPCS

## 2023-10-17 PROCEDURE — 2500000001 HC RX 250 WO HCPCS SELF ADMINISTERED DRUGS (ALT 637 FOR MEDICARE OP): Performed by: INTERNAL MEDICINE

## 2023-10-17 RX ORDER — LEVOTHYROXINE SODIUM 25 UG/1
25 TABLET ORAL
Status: DISCONTINUED | OUTPATIENT
Start: 2023-10-17 | End: 2023-10-18 | Stop reason: HOSPADM

## 2023-10-17 RX ORDER — CELECOXIB 200 MG/1
200 CAPSULE ORAL 2 TIMES DAILY PRN
Status: DISCONTINUED | OUTPATIENT
Start: 2023-10-17 | End: 2023-10-18 | Stop reason: HOSPADM

## 2023-10-17 RX ADMIN — FLUTICASONE FUROATE AND VILANTEROL TRIFENATATE 1 PUFF: 200; 25 POWDER RESPIRATORY (INHALATION) at 10:02

## 2023-10-17 RX ADMIN — DEXAMETHASONE SODIUM PHOSPHATE 3.2 MG: 4 INJECTION, SOLUTION INTRAMUSCULAR; INTRAVENOUS at 16:31

## 2023-10-17 RX ADMIN — CEFTRIAXONE SODIUM 2 G: 2 INJECTION, SOLUTION INTRAVENOUS at 10:06

## 2023-10-17 RX ADMIN — CHOLECALCIFEROL TAB 125 MCG (5000 UNIT) 5000 UNITS: 125 TAB at 10:11

## 2023-10-17 RX ADMIN — PANTOPRAZOLE SODIUM 40 MG: 40 TABLET, DELAYED RELEASE ORAL at 07:05

## 2023-10-17 RX ADMIN — LEVOTHYROXINE SODIUM 25 MCG: 0.03 TABLET ORAL at 11:30

## 2023-10-17 RX ADMIN — CELECOXIB 200 MG: 200 CAPSULE ORAL at 18:31

## 2023-10-17 RX ADMIN — CELECOXIB 200 MG: 200 CAPSULE ORAL at 16:45

## 2023-10-17 RX ADMIN — GUAIFENESIN 1200 MG: 600 TABLET ORAL at 10:11

## 2023-10-17 RX ADMIN — LEVOTHYROXINE SODIUM 25 MCG: 0.03 TABLET ORAL at 16:49

## 2023-10-17 RX ADMIN — TIOTROPIUM BROMIDE INHALATION SPRAY 2 PUFF: 3.12 SPRAY, METERED RESPIRATORY (INHALATION) at 07:00

## 2023-10-17 RX ADMIN — DEXAMETHASONE SODIUM PHOSPHATE 3.2 MG: 4 INJECTION, SOLUTION INTRAMUSCULAR; INTRAVENOUS at 10:11

## 2023-10-17 RX ADMIN — AZITHROMYCIN MONOHYDRATE 500 MG: 500 INJECTION, POWDER, LYOPHILIZED, FOR SOLUTION INTRAVENOUS at 10:12

## 2023-10-17 RX ADMIN — DEXAMETHASONE SODIUM PHOSPHATE 3.2 MG: 4 INJECTION, SOLUTION INTRAMUSCULAR; INTRAVENOUS at 01:52

## 2023-10-17 ASSESSMENT — COGNITIVE AND FUNCTIONAL STATUS - GENERAL
DAILY ACTIVITIY SCORE: 24
MOBILITY SCORE: 24

## 2023-10-17 ASSESSMENT — PAIN SCALES - GENERAL
PAINLEVEL_OUTOF10: 6
PAINLEVEL_OUTOF10: 4

## 2023-10-17 ASSESSMENT — PAIN - FUNCTIONAL ASSESSMENT: PAIN_FUNCTIONAL_ASSESSMENT: 0-10

## 2023-10-17 NOTE — PROGRESS NOTES
Yaneth Alvarez is a 77 y.o. female on day 1 of admission presenting with Community acquired pneumonia.    Subjective   Expand All Collapse All    History Of Present Illness  Yaneth Alvarez is a 77 y.o. female patient of Dr. Green who has a history of emphysema decreasing lung volumes worsening kyphosis who developed acute malaise aches and pains over a 24-hour.  Followed by left lower chest pain with pleuritic component and malaise.  Patient began to feel worse to the point that she was concerned regarding the chest pain so was brought in by EMS after t she called because of the concerning pain that she was having.  Evaluation in the ER is significant for a significant left lower lung infiltrate consistent with community-acquired pneumonia.  She is hypoxic with ambulation and with pleuritic chest pain so was brought in for treatment.  She has been initiated on community-acquired pneumonia protocol with Zithromax and Rocephin.  Did go over her CAT scan with her and her daughter at bedside this evening.  Patient had a recent CAT scan on in August that did not demonstrate any nodule or infiltrate there.  Clinically with antibiotics and treatment she has received so far she is beginning to feel better..     Past Medical History  Medical History        Past Medical History:   Diagnosis Date    Acute embolism and thrombosis of unspecified deep veins of unspecified lower extremity (CMS/HCC)       Acute deep vein thrombosis (DVT)    Acute respiratory failure with hypoxia (CMS/HCC)       Acute respiratory failure with hypoxemia    Age-related nuclear cataract, unspecified eye       Nuclear senile cataract    Chronic obstructive pulmonary disease, unspecified (CMS/HCC)       COPD, moderate    COVID-19       COVID-19    Migraine, unspecified, not intractable, without status migrainosus       Migraine    Nontoxic multinodular goiter       Nontoxic multinodular goiter    Personal history of colonic polyps       History  of colonic polyps    Personal history of malignant neoplasm of other parts of uterus       History of malignant neoplasm of uterus    Personal history of other benign neoplasm       History of pituitary adenoma    Personal history of other diseases of the circulatory system       History of congestive heart failure    Personal history of other diseases of the circulatory system       History of pulmonary hypertension    Personal history of other diseases of the circulatory system       History of varicose veins of lower extremity    Personal history of other diseases of the respiratory system       History of allergic rhinitis    Personal history of other diseases of the respiratory system       History of asthma    Personal history of other endocrine, nutritional and metabolic disease       History of acidosis    Personal history of other endocrine, nutritional and metabolic disease       History of vitamin D deficiency    Personal history of other endocrine, nutritional and metabolic disease       History of familial combined hyperlipidemia    Personal history of other mental and behavioral disorders       History of anxiety    Personal history of other specified conditions       H/O multiple pulmonary nodules    Personal history of other specified conditions       History of gait disorder    Personal history of pneumonia (recurrent)       History of pneumonia    Solitary pulmonary nodule       Pulmonary nodule    Spondylosis without myelopathy or radiculopathy, lumbar region       Spondylosis of lumbar spine    Unilateral primary osteoarthritis, left knee       Arthritis of left knee    Unilateral primary osteoarthritis, right knee       Arthritis of right knee    Unspecified osteoarthritis, unspecified site       Osteoarthritis            Surgical History  Surgical History         Past Surgical History:   Procedure Laterality Date    OTHER SURGICAL HISTORY   10/07/2022     Knee replacement    OTHER SURGICAL  "HISTORY   10/07/2022     Facet joint injection    OTHER SURGICAL HISTORY   10/07/2022     Sigmoidoscopy    OTHER SURGICAL HISTORY   10/07/2022     Colonoscopy            Social History  She reports that she has quit smoking. Her smoking use included cigarettes. She has never used smokeless tobacco. She reports that she does not currently use alcohol. No history on file for drug use.     Family History  Family History   No family history on file.        Allergies  Patient has no known allergies.        10/17: Patient felt better yesterday but later in the evening began to feel poorly again.  Think she was very irritated that she was not getting help to get up etc. in the hospital.  Patient however is doing relatively well but feeling weak did have some chills White count is normal.  Consider evaluation for hospital at home awaiting pneumococcal and urinary Legionella antigen.  Continue current antibiotics anticipate discharge home the next 24 to 48 hours.         Objective     Physical Exam    Last Recorded Vitals  Blood pressure 113/73, pulse 65, temperature 36.3 °C (97.3 °F), temperature source Temporal, resp. rate 16, height 1.626 m (5' 4\"), weight 94.1 kg (207 lb 7.3 oz), SpO2 93 %.  Intake/Output last 3 Shifts:  I/O last 3 completed shifts:  In: - (0 mL/kg)   Out: 2 (0 mL/kg) [Urine:2 (0 mL/kg/hr)]  Dosing Weight: 207 kg     Relevant Results                Scheduled medications  atorvastatin, 10 mg, oral, Once per day on Mon Thu  azithromycin (Zithromax) 500 mg in dextrose 5 % 250 mL IV, 500 mg, intravenous, q24h  cefTRIAXone, 2 g, intravenous, q24h  cholecalciferol, 5,000 Units, oral, Daily  dexAMETHasone, 3.2 mg, intravenous, q8h  tiotropium, 2 Inhalation, inhalation, Daily   And  fluticasone furoate-vilanteroL, 1 puff, inhalation, Daily  furosemide, 40 mg, oral, Daily  guaiFENesin, 1,200 mg, oral, BID  levothyroxine, 25 mcg, oral, Once per day on Sun Tue Thu Sat  pantoprazole, 40 mg, oral, Daily before " breakfast      Continuous medications     PRN medications  PRN medications: acetaminophen **OR** acetaminophen **OR** acetaminophen, bisacodyl, celecoxib, ipratropium-albuteroL, ondansetron, oxygen, SUMAtriptan                 Assessment/Plan   Principal Problem:    Community acquired pneumonia  Active Problems:    Emphysema lung (CMS/[facility])    Decreased strength involving knee joint    Gait abnormality    Hypothyroidism, adult    Hyperlipemia    Elevated cortisol level    Adenoma of pituitary (CMS/[facility])    Community acquired bacterial pneumonia    Community-acquired pneumonia protocol  Rocephin and azithromycin  Humibid  Aerosols  IV Decadron  Comfort medications  Urinary pneumococcal and Legionella antigen  Sputum culture  Blood cultures  Discharge planning will be home  Check labs in a.m.  See orders for complete plan          I spent 40 minutes in the professional and overall care of this patient.      Anival Langston MD

## 2023-10-17 NOTE — PROGRESS NOTES
Pt admitted for malaise, pneumonia.  Pt states she is independent at home where she lives alone.  No discharge needs identified at this time but [facility] will continue to follow this pt.

## 2023-10-17 NOTE — PROGRESS NOTES
Physical Therapy                 Therapy Communication Note    Patient Name: Yaneth Alvarez  MRN: [id number]  Today's Date: 10/17/2023     Discipline: Physical Therapy    Missed Visit Reason:      Missed Time:     Comment: Patient screened in room 2332. Witnessed patient transfer OOB independently, amb 150 feet with SBA x1, no LOB. Mild drop in O2 sats noted from 94% on room air at rest to 88% after amb - recovered within 2 mins to 95%. Patient reports no concerns about mobility, only mild concern about chest/thoracic pain due to deep breathing with PNA. Educated patient about continued increased amb in hallways with NSG supervision while admitted. Patient has no identified needs for skilled therapy at this time, will discharge from PT.

## 2023-10-18 VITALS
DIASTOLIC BLOOD PRESSURE: 78 MMHG | WEIGHT: 207.45 LBS | HEART RATE: 64 BPM | RESPIRATION RATE: 16 BRPM | OXYGEN SATURATION: 94 % | SYSTOLIC BLOOD PRESSURE: 120 MMHG | BODY MASS INDEX: 35.42 KG/M2 | HEIGHT: 64 IN | TEMPERATURE: 96.7 F

## 2023-10-18 LAB
ANION GAP SERPL CALC-SCNC: 10 MMOL/L (ref 10–20)
BASOPHILS # BLD AUTO: 0.02 X10*3/UL (ref 0–0.1)
BASOPHILS NFR BLD AUTO: 0.2 %
BUN SERPL-MCNC: 19 MG/DL (ref 6–23)
CALCIUM SERPL-MCNC: 8.9 MG/DL (ref 8.6–10.3)
CHLORIDE SERPL-SCNC: 108 MMOL/L (ref 98–107)
CO2 SERPL-SCNC: 26 MMOL/L (ref 21–32)
CREAT SERPL-MCNC: 0.84 MG/DL (ref 0.5–1.05)
EOSINOPHIL # BLD AUTO: 0 X10*3/UL (ref 0–0.4)
EOSINOPHIL NFR BLD AUTO: 0 %
ERYTHROCYTE [DISTWIDTH] IN BLOOD BY AUTOMATED COUNT: 12.6 % (ref 11.5–14.5)
GFR SERPL CREATININE-BSD FRML MDRD: 71 ML/MIN/1.73M*2
GLUCOSE SERPL-MCNC: 132 MG/DL (ref 74–99)
HCT VFR BLD AUTO: 33.6 % (ref 36–46)
HGB BLD-MCNC: 11.3 G/DL (ref 12–16)
IMM GRANULOCYTES # BLD AUTO: 0.15 X10*3/UL (ref 0–0.5)
IMM GRANULOCYTES NFR BLD AUTO: 1.7 % (ref 0–0.9)
LEGIONELLA AG UR QL: NEGATIVE
LYMPHOCYTES # BLD AUTO: 0.67 X10*3/UL (ref 0.8–3)
LYMPHOCYTES NFR BLD AUTO: 7.8 %
MAGNESIUM SERPL-MCNC: 2.15 MG/DL (ref 1.6–2.4)
MCH RBC QN AUTO: 31.6 PG (ref 26–34)
MCHC RBC AUTO-ENTMCNC: 33.6 G/DL (ref 32–36)
MCV RBC AUTO: 94 FL (ref 80–100)
MONOCYTES # BLD AUTO: 0.39 X10*3/UL (ref 0.05–0.8)
MONOCYTES NFR BLD AUTO: 4.5 %
NEUTROPHILS # BLD AUTO: 7.4 X10*3/UL (ref 1.6–5.5)
NEUTROPHILS NFR BLD AUTO: 85.8 %
NRBC BLD-RTO: 0 /100 WBCS (ref 0–0)
PLATELET # BLD AUTO: 365 X10*3/UL (ref 150–450)
PMV BLD AUTO: 10.3 FL (ref 7.5–11.5)
POTASSIUM SERPL-SCNC: 4.3 MMOL/L (ref 3.5–5.3)
RBC # BLD AUTO: 3.58 X10*6/UL (ref 4–5.2)
S PNEUM AG UR QL: NEGATIVE
SODIUM SERPL-SCNC: 140 MMOL/L (ref 136–145)
WBC # BLD AUTO: 8.6 X10*3/UL (ref 4.4–11.3)

## 2023-10-18 PROCEDURE — 85025 COMPLETE CBC W/AUTO DIFF WBC: CPT | Performed by: INTERNAL MEDICINE

## 2023-10-18 PROCEDURE — 2500000001 HC RX 250 WO HCPCS SELF ADMINISTERED DRUGS (ALT 637 FOR MEDICARE OP): Performed by: INTERNAL MEDICINE

## 2023-10-18 PROCEDURE — 83735 ASSAY OF MAGNESIUM: CPT | Performed by: INTERNAL MEDICINE

## 2023-10-18 PROCEDURE — G0378 HOSPITAL OBSERVATION PER HR: HCPCS

## 2023-10-18 PROCEDURE — 99239 HOSP IP/OBS DSCHRG MGMT >30: CPT | Performed by: INTERNAL MEDICINE

## 2023-10-18 PROCEDURE — 2500000004 HC RX 250 GENERAL PHARMACY W/ HCPCS (ALT 636 FOR OP/ED): Performed by: INTERNAL MEDICINE

## 2023-10-18 PROCEDURE — 36415 COLL VENOUS BLD VENIPUNCTURE: CPT | Performed by: INTERNAL MEDICINE

## 2023-10-18 PROCEDURE — 80048 BASIC METABOLIC PNL TOTAL CA: CPT | Performed by: INTERNAL MEDICINE

## 2023-10-18 RX ORDER — LEVOTHYROXINE SODIUM 25 UG/1
25 TABLET ORAL ONCE
Status: COMPLETED | OUTPATIENT
Start: 2023-10-18 | End: 2023-10-18

## 2023-10-18 RX ORDER — CELECOXIB 200 MG/1
200 CAPSULE ORAL 2 TIMES DAILY PRN
Qty: 60 CAPSULE | Refills: 1 | Status: SHIPPED | OUTPATIENT
Start: 2023-10-18

## 2023-10-18 RX ORDER — METHYLPREDNISOLONE 4 MG/1
TABLET ORAL
Qty: 21 TABLET | Refills: 0 | Status: SHIPPED | OUTPATIENT
Start: 2023-10-18 | End: 2023-10-25

## 2023-10-18 RX ORDER — AZITHROMYCIN 500 MG/1
500 TABLET, FILM COATED ORAL DAILY
Qty: 3 TABLET | Refills: 0 | Status: SHIPPED | OUTPATIENT
Start: 2023-10-18

## 2023-10-18 RX ORDER — CEFDINIR 300 MG/1
300 CAPSULE ORAL 2 TIMES DAILY
Qty: 8 CAPSULE | Refills: 0 | Status: SHIPPED | OUTPATIENT
Start: 2023-10-18 | End: 2023-10-22

## 2023-10-18 RX ADMIN — ATORVASTATIN CALCIUM 10 MG: 10 TABLET, FILM COATED ORAL at 08:50

## 2023-10-18 RX ADMIN — CEFTRIAXONE SODIUM 2 G: 2 INJECTION, SOLUTION INTRAVENOUS at 12:29

## 2023-10-18 RX ADMIN — DEXAMETHASONE SODIUM PHOSPHATE 3.2 MG: 4 INJECTION, SOLUTION INTRAMUSCULAR; INTRAVENOUS at 01:53

## 2023-10-18 RX ADMIN — FUROSEMIDE 40 MG: 40 TABLET ORAL at 08:50

## 2023-10-18 RX ADMIN — PANTOPRAZOLE SODIUM 40 MG: 40 TABLET, DELAYED RELEASE ORAL at 06:26

## 2023-10-18 RX ADMIN — DEXAMETHASONE SODIUM PHOSPHATE 3.2 MG: 4 INJECTION, SOLUTION INTRAMUSCULAR; INTRAVENOUS at 08:50

## 2023-10-18 RX ADMIN — CHOLECALCIFEROL TAB 125 MCG (5000 UNIT) 5000 UNITS: 125 TAB at 08:50

## 2023-10-18 RX ADMIN — FLUTICASONE FUROATE AND VILANTEROL TRIFENATATE 1 PUFF: 200; 25 POWDER RESPIRATORY (INHALATION) at 06:27

## 2023-10-18 RX ADMIN — LEVOTHYROXINE SODIUM 25 MCG: 0.03 TABLET ORAL at 09:13

## 2023-10-18 RX ADMIN — GUAIFENESIN 1200 MG: 600 TABLET ORAL at 08:50

## 2023-10-18 RX ADMIN — AZITHROMYCIN MONOHYDRATE 500 MG: 500 INJECTION, POWDER, LYOPHILIZED, FOR SOLUTION INTRAVENOUS at 08:51

## 2023-10-18 RX ADMIN — TIOTROPIUM BROMIDE INHALATION SPRAY 2 PUFF: 3.12 SPRAY, METERED RESPIRATORY (INHALATION) at 06:28

## 2023-10-18 ASSESSMENT — COGNITIVE AND FUNCTIONAL STATUS - GENERAL
MOBILITY SCORE: 24
DAILY ACTIVITIY SCORE: 24

## 2023-10-18 NOTE — PROGRESS NOTES
Pt is medically cleared for discharge.  Patient denies home needs and may consider staying with her daughter.

## 2023-10-18 NOTE — DISCHARGE SUMMARY
Discharge Diagnosis  Community acquired pneumonia      Principal Problem:    Community acquired pneumonia  Active Problems:    Emphysema lung (CMS/HCC)    Decreased strength involving knee joint    Gait abnormality    Hypothyroidism, adult    Hyperlipemia    Elevated cortisol level    Adenoma of pituitary (CMS/HCC)    Community acquired bacterial pneumonia  Issues Requiring Follow-Up  Will need follow-up CAT scan of the lungs in the next 1 to 2 months to look for resolution of pneumonia.     Your medication list        START taking these medications        Instructions Last Dose Given Next Dose Due   azithromycin 500 mg tablet  Commonly known as: Zithromax      Take 1 tablet (500 mg) by mouth once daily.       cefdinir 300 mg capsule  Commonly known as: Omnicef      Take 1 capsule (300 mg) by mouth 2 times a day for 4 days.       celecoxib 200 mg capsule  Commonly known as: CeleBREX      Take 1 capsule (200 mg) by mouth 2 times a day as needed for mild pain (1 - 3) (For significant aches pains or pleuritic chest pain).       methylPREDNISolone 4 mg tablets  Commonly known as: Medrol Dospak      Take as directed on package.              CONTINUE taking these medications        Instructions Last Dose Given Next Dose Due   albuterol 90 mcg/actuation inhaler           atorvastatin 10 mg tablet  Commonly known as: Lipitor           ipratropium-albuteroL 0.5-2.5 mg/3 mL nebulizer solution  Commonly known as: Duo-Neb           levothyroxine 25 mcg tablet  Commonly known as: Synthroid, Levoxyl           levothyroxine 25 mcg tablet  Commonly known as: Synthroid, Levoxyl           montelukast 10 mg tablet  Commonly known as: Singulair           Thera-D 50 MCG (2000 UT) tablet  Generic drug: cholecalciferol           Trelegy Ellipta 200-62.5-25 mcg blister with device  Generic drug: fluticasone-umeclidin-vilanter                     Where to Get Your Medications        These medications were sent to [id number] -  [place] ([place]), [place] - [address]  [address] ([place] [address]      Phone: [phone]   azithromycin 500 mg tablet  cefdinir 300 mg capsule  celecoxib 200 mg capsule  methylPREDNISolone 4 mg tablets         Test Results Pending At Discharge  Pending Labs       Order Current Status    Blood Culture Preliminary result    Blood Culture Preliminary result            Hospital Course     History Of Present Illness  Yaneth Alvarez is a 77 y.o. female patient of Dr. Green who has a history of emphysema decreasing lung volumes worsening kyphosis who developed acute malaise aches and pains over a 24-hour.  Followed by left lower chest pain with pleuritic component and malaise.  Patient began to feel worse to the point that she was concerned regarding the chest pain so was brought in by EMS after t she called because of the concerning pain that she was having.  Evaluation in the ER is significant for a significant left lower lung infiltrate consistent with community-acquired pneumonia.  She is hypoxic with ambulation and with pleuritic chest pain so was brought in for treatment.  She has been initiated on community-acquired pneumonia protocol with Zithromax and Rocephin.  Did go over her CAT scan with her and her daughter at bedside this evening.  Patient had a recent CAT scan on in August that did not demonstrate any nodule or infiltrate there.  Clinically with antibiotics and treatment she has received so far she is beginning to feel better..     Past Medical History  Medical History           Past Medical History:   Diagnosis Date    Acute embolism and thrombosis of unspecified deep veins of unspecified lower extremity (CMS/HCC)       Acute deep vein thrombosis (DVT)    Acute respiratory failure with hypoxia (CMS/HCC)       Acute respiratory failure with hypoxemia    Age-related nuclear cataract, unspecified eye       Nuclear senile cataract    Chronic obstructive pulmonary disease,  unspecified (CMS/HCC)       COPD, moderate    COVID-19       COVID-19    Migraine, unspecified, not intractable, without status migrainosus       Migraine    Nontoxic multinodular goiter       Nontoxic multinodular goiter    Personal history of colonic polyps       History of colonic polyps    Personal history of malignant neoplasm of other parts of uterus       History of malignant neoplasm of uterus    Personal history of other benign neoplasm       History of pituitary adenoma    Personal history of other diseases of the circulatory system       History of congestive heart failure    Personal history of other diseases of the circulatory system       History of pulmonary hypertension    Personal history of other diseases of the circulatory system       History of varicose veins of lower extremity    Personal history of other diseases of the respiratory system       History of allergic rhinitis    Personal history of other diseases of the respiratory system       History of asthma    Personal history of other endocrine, nutritional and metabolic disease       History of acidosis    Personal history of other endocrine, nutritional and metabolic disease       History of vitamin D deficiency    Personal history of other endocrine, nutritional and metabolic disease       History of familial combined hyperlipidemia    Personal history of other mental and behavioral disorders       History of anxiety    Personal history of other specified conditions       H/O multiple pulmonary nodules    Personal history of other specified conditions       History of gait disorder    Personal history of pneumonia (recurrent)       History of pneumonia    Solitary pulmonary nodule       Pulmonary nodule    Spondylosis without myelopathy or radiculopathy, lumbar region       Spondylosis of lumbar spine    Unilateral primary osteoarthritis, left knee       Arthritis of left knee    Unilateral primary osteoarthritis, right knee        Arthritis of right knee    Unspecified osteoarthritis, unspecified site       Osteoarthritis            Surgical History  Surgical History             Past Surgical History:   Procedure Laterality Date    OTHER SURGICAL HISTORY   10/07/2022     Knee replacement    OTHER SURGICAL HISTORY   10/07/2022     Facet joint injection    OTHER SURGICAL HISTORY   10/07/2022     Sigmoidoscopy    OTHER SURGICAL HISTORY   10/07/2022     Colonoscopy            Social History  She reports that she has quit smoking. Her smoking use included cigarettes. She has never used smokeless tobacco. She reports that she does not currently use alcohol. No history on file for drug use.     Family History  Family History   No family history on file.         Allergies  Patient has no known allergies.         10/17: Patient felt better yesterday but later in the evening began to feel poorly again.  Think she was very irritated that she was not getting help to get up etc. in the hospital.  Patient however is doing relatively well but feeling weak did have some chills White count is normal.  Consider evaluation for hospital at home awaiting pneumococcal and urinary Legionella antigen.  Continue current antibiotics anticipate discharge home the next 24 to 48 hours.  10/18: Patient is feeling much better pleuritic chest pain improved.  Will discharge on Omnicef, Zithromax and Medrol Dosepak.  As needed pulse dose Celebrex 200 p.o. twice daily.  Advised patient to take this only on occasion and for no longer than 2 days in a row.       Patient and/or family are to be given a copy of their discharge profile as well as discharge medications sheet prior to leaving the hospital. Please review these sheets for more concise discharge information and instructions.        35 minutes spent in the care of this patient.       Pertinent Physical Exam At Time of Discharge  Physical Exam    Outpatient Follow-Up  Future Appointments   Date Time Provider Department  [place]   10/11/2024  9:30 AM Erlinda Law MD [id number]         Anival Langston MD

## 2023-10-18 NOTE — PROGRESS NOTES
Subjective   Expand All Collapse All    History Of Present Illness  Yaneth Alvarez is a 77 y.o. female patient of Dr. Green who has a history of emphysema decreasing lung volumes worsening kyphosis who developed acute malaise aches and pains over a 24-hour.  Followed by left lower chest pain with pleuritic component and malaise.  Patient began to feel worse to the point that she was concerned regarding the chest pain so was brought in by EMS after t she called because of the concerning pain that she was having.  Evaluation in the ER is significant for a significant left lower lung infiltrate consistent with community-acquired pneumonia.  She is hypoxic with ambulation and with pleuritic chest pain so was brought in for treatment.  She has been initiated on community-acquired pneumonia protocol with Zithromax and Rocephin.  Did go over her CAT scan with her and her daughter at bedside this evening.  Patient had a recent CAT scan on in August that did not demonstrate any nodule or infiltrate there.  Clinically with antibiotics and treatment she has received so far she is beginning to feel better..     Past Medical History  Medical History        Past Medical History:   Diagnosis Date    Acute embolism and thrombosis of unspecified deep veins of unspecified lower extremity (CMS/HCC)       Acute deep vein thrombosis (DVT)    Acute respiratory failure with hypoxia (CMS/HCC)       Acute respiratory failure with hypoxemia    Age-related nuclear cataract, unspecified eye       Nuclear senile cataract    Chronic obstructive pulmonary disease, unspecified (CMS/HCC)       COPD, moderate    COVID-19       COVID-19    Migraine, unspecified, not intractable, without status migrainosus       Migraine    Nontoxic multinodular goiter       Nontoxic multinodular goiter    Personal history of colonic polyps       History of colonic polyps    Personal history of malignant neoplasm of other parts of uterus       History of  malignant neoplasm of uterus    Personal history of other benign neoplasm       History of pituitary adenoma    Personal history of other diseases of the circulatory system       History of congestive heart failure    Personal history of other diseases of the circulatory system       History of pulmonary hypertension    Personal history of other diseases of the circulatory system       History of varicose veins of lower extremity    Personal history of other diseases of the respiratory system       History of allergic rhinitis    Personal history of other diseases of the respiratory system       History of asthma    Personal history of other endocrine, nutritional and metabolic disease       History of acidosis    Personal history of other endocrine, nutritional and metabolic disease       History of vitamin D deficiency    Personal history of other endocrine, nutritional and metabolic disease       History of familial combined hyperlipidemia    Personal history of other mental and behavioral disorders       History of anxiety    Personal history of other specified conditions       H/O multiple pulmonary nodules    Personal history of other specified conditions       History of gait disorder    Personal history of pneumonia (recurrent)       History of pneumonia    Solitary pulmonary nodule       Pulmonary nodule    Spondylosis without myelopathy or radiculopathy, lumbar region       Spondylosis of lumbar spine    Unilateral primary osteoarthritis, left knee       Arthritis of left knee    Unilateral primary osteoarthritis, right knee       Arthritis of right knee    Unspecified osteoarthritis, unspecified site       Osteoarthritis            Surgical History  Surgical History         Past Surgical History:   Procedure Laterality Date    OTHER SURGICAL HISTORY   10/07/2022     Knee replacement    OTHER SURGICAL HISTORY   10/07/2022     Facet joint injection    OTHER SURGICAL HISTORY   10/07/2022     Sigmoidoscopy     "OTHER SURGICAL HISTORY   10/07/2022     Colonoscopy            Social History  She reports that she has quit smoking. Her smoking use included cigarettes. She has never used smokeless tobacco. She reports that she does not currently use alcohol. No history on file for drug use.     Family History  Family History   No family history on file.        Allergies  Patient has no known allergies.        10/17: Patient felt better yesterday but later in the evening began to feel poorly again.  Think she was very irritated that she was not getting help to get up etc. in the hospital.  Patient however is doing relatively well but feeling weak did have some chills White count is normal.  Consider evaluation for hospital at home awaiting pneumococcal and urinary Legionella antigen.  Continue current antibiotics anticipate discharge home the next 24 to 48 hours.  10/18: Patient is feeling much better pleuritic chest pain improved.  Will discharge on Omnicef, Zithromax and Medrol Dosepak.  As needed pulse dose Celebrex 200 p.o. twice daily.  Advised patient to take this only on occasion and for no longer than 2 days in a row.      Patient and/or family are to be given a copy of their discharge profile as well as discharge medications sheet prior to leaving the hospital. Please review these sheets for more concise discharge information and instructions.     35 minutes spent in the care of this patient.    Objective     Physical Exam  Cardiovascular:      Rate and Rhythm: Normal rate.   Pulmonary:      Effort: Pulmonary effort is normal.   Neurological:      Mental Status: She is alert.         Last Recorded Vitals  Blood pressure 147/83, pulse 66, temperature 35.9 °C (96.7 °F), temperature source Temporal, resp. rate 18, height 1.626 m (5' 4\"), weight 94.1 kg (207 lb 7.3 oz), SpO2 95 %.  Intake/Output last 3 Shifts:  I/O last 3 completed shifts:  In: 970 (4.7 mL/kg) [P.O.:720; IV Piggyback:250]  Out: 2 (0 mL/kg) [Urine:2 (0 " mL/kg/hr)]  Dosing Weight: 207 kg     Relevant Results                Scheduled medications  atorvastatin, 10 mg, oral, Once per day on Mon Thu  azithromycin (Zithromax) 500 mg in dextrose 5 % 250 mL IV, 500 mg, intravenous, q24h  cefTRIAXone, 2 g, intravenous, q24h  cholecalciferol, 5,000 Units, oral, Daily  dexAMETHasone, 3.2 mg, intravenous, q8h  tiotropium, 2 Inhalation, inhalation, Daily   And  fluticasone furoate-vilanteroL, 1 puff, inhalation, Daily  furosemide, 40 mg, oral, Daily  guaiFENesin, 1,200 mg, oral, BID  levothyroxine, 25 mcg, oral, Once per day on Sun Tue Thu Sat  pantoprazole, 40 mg, oral, Daily before breakfast      Continuous medications     PRN medications  PRN medications: acetaminophen **OR** acetaminophen **OR** acetaminophen, bisacodyl, celecoxib, ipratropium-albuteroL, ondansetron, oxygen, SUMAtriptan                 Assessment/Plan   Principal Problem:    Community acquired pneumonia  Active Problems:    Emphysema lung (CMS/[facility])    Decreased strength involving knee joint    Gait abnormality    Hypothyroidism, adult    Hyperlipemia    Elevated cortisol level    Adenoma of pituitary (CMS/[facility])    Community acquired bacterial pneumonia    Community-acquired pneumonia protocol  Rocephin and azithromycin  Humibid  Aerosols  IV Decadron  Comfort medications  Urinary pneumococcal and Legionella antigen  Sputum culture  Blood cultures  Discharge planning will be home today.  Check labs in a.m.  See orders for complete plan          I spent 40 minutes in the professional and overall care of this patient.      Anival Langston MD

## 2023-10-20 LAB
BACTERIA BLD CULT: NORMAL
BACTERIA BLD CULT: NORMAL

## 2023-10-23 ENCOUNTER — TELEPHONE (OUTPATIENT)
Dept: ENDOCRINOLOGY | Facility: CLINIC | Age: 78
End: 2023-10-23
Payer: MEDICARE

## 2023-10-24 ENCOUNTER — TELEPHONE (OUTPATIENT)
Dept: ENDOCRINOLOGY | Facility: CLINIC | Age: 78
End: 2023-10-24
Payer: MEDICARE

## 2023-10-24 NOTE — TELEPHONE ENCOUNTER
Left voicemail with patient to return call    MD Daniel Romo MA; Yaquelin Calderon MA; Lamar Chambers MA  Labs have been reviewed.  All values look good.  Please continue the same dose of Levothyroxine and follow up as scheduled.

## 2023-10-27 ENCOUNTER — ANCILLARY PROCEDURE (OUTPATIENT)
Dept: RADIOLOGY | Facility: CLINIC | Age: 78
End: 2023-10-27
Payer: MEDICARE

## 2023-10-27 DIAGNOSIS — R91.1 SOLITARY PULMONARY NODULE: ICD-10-CM

## 2023-10-27 PROCEDURE — 71046 X-RAY EXAM CHEST 2 VIEWS: CPT | Performed by: RADIOLOGY

## 2023-10-27 PROCEDURE — 71046 X-RAY EXAM CHEST 2 VIEWS: CPT

## 2023-10-30 ENCOUNTER — ANCILLARY PROCEDURE (OUTPATIENT)
Dept: RADIOLOGY | Facility: CLINIC | Age: 78
End: 2023-10-30
Payer: MEDICARE

## 2023-10-30 DIAGNOSIS — Z78.0 ASYMPTOMATIC MENOPAUSAL STATE: ICD-10-CM

## 2023-10-30 DIAGNOSIS — R29.890 LOSS OF HEIGHT: ICD-10-CM

## 2023-10-30 DIAGNOSIS — E55.9 VITAMIN D DEFICIENCY, UNSPECIFIED: ICD-10-CM

## 2023-10-30 PROCEDURE — 77080 DXA BONE DENSITY AXIAL: CPT | Performed by: RADIOLOGY

## 2023-10-30 PROCEDURE — 77080 DXA BONE DENSITY AXIAL: CPT

## 2024-02-19 ENCOUNTER — HOSPITAL ENCOUNTER (OUTPATIENT)
Dept: RADIOLOGY | Facility: CLINIC | Age: 79
Discharge: HOME | End: 2024-02-19
Payer: MEDICARE

## 2024-02-19 DIAGNOSIS — R91.8 OTHER NONSPECIFIC ABNORMAL FINDING OF LUNG FIELD: ICD-10-CM

## 2024-02-19 PROCEDURE — 71250 CT THORAX DX C-: CPT

## 2024-02-19 PROCEDURE — 71250 CT THORAX DX C-: CPT | Performed by: RADIOLOGY

## 2024-09-03 ENCOUNTER — HOSPITAL ENCOUNTER (OUTPATIENT)
Dept: RADIOLOGY | Facility: CLINIC | Age: 79
Discharge: HOME | End: 2024-09-03
Payer: MEDICARE

## 2024-09-03 DIAGNOSIS — R91.1 SOLITARY PULMONARY NODULE: ICD-10-CM

## 2024-09-03 PROCEDURE — 71250 CT THORAX DX C-: CPT | Performed by: RADIOLOGY

## 2024-09-03 PROCEDURE — 71250 CT THORAX DX C-: CPT

## 2024-10-10 NOTE — PATIENT INSTRUCTIONS
Thank you for choosing [facility]  for your health care needs.  If you have any questions, concerns or medical needs, please feel free to contact our office at [phone].    Please ensure you complete your blood work one week before the next scheduled appointment.    To obtain blood tests at 8AM   To continue Levothyroxine 25mcg po daily 4 days per week  To continue Levothyroxine 50mcg po daily 3 days per week   Take levothyroxine on an empty stomach with water alone, 30-60 minutes before eating or taking other medications, 4 hours before any calcium or iron supplement  To restart exercise - 30 mins daily   No diet  is more superior with regards to weight loss   Will follow up with results

## 2024-10-10 NOTE — PROGRESS NOTES
Subjective   Ms. Alvarez is an 78-year-old female who presents for follow up for a pituitary adenoma as well as hypothyroidism in 2001.      The patient states that she had a pituitary adenoma which was followed for 10 to 20 years. Her previous endocrinologist was Dr. Leblanc in [place].     Her last MRI from March 15, 2019 did not reveal any pituitary adenomas. She previously reports that she had a 6 mm lesion on a previous MRI of the brain.      The patient previously had hypothyroidism and she states that she never felt fatigued even after working 2 jobs. She states that she had radioactive iodine treatment and recalls that in 2001 she developed hair loss, weight gain and fatigue.    She states ten days ago she weighed 197 lbs.  She cut out potatoes, bread and sweets.       Family history:  Sister - total thyroidectomy   Daughter - hypothyroidism      Current Regimen  Levothyroxine 25mcg po daily 4 days per week  Levothyroxine 50mcg po daily 3 days per week      Symptoms are as listed below:  Energy level -  decreased   Sleep - disrupted by GERD occasionally   Temperature intolerances -   increased heat over the summer; now back to being chornically cold   Bowel movements -  denies constipation   Skin changes - itching  Hair changes -hospitalized for three weeks in December 2021 for pneumonia due to COPD; experienced tinea effluvium; regrowing currently   Tremors - denies  Palpitations - denies   Weight change - gained  Headaches - denies  Vision changes- denies      Exercise regimen - has Silver Sneakers.  She has joined [employer]       She has osteopenia     Review of Systems   Constitutional:  Positive for unexpected weight change (Weight gain).   Eyes:  Negative for visual disturbance.   Respiratory:  Positive for shortness of breath.    Cardiovascular:  Positive for leg swelling.   Musculoskeletal:  Positive for back pain.   Neurological:  Positive for headaches (Woke up with headache today).   All  "other systems reviewed and are negative.        Objective   /78 (BP Location: Left arm, Patient Position: Sitting, BP Cuff Size: Adult)   Pulse 72   Ht 1.638 m (5' 4.5\")   Wt 92.1 kg (203 lb)   BMI 34.31 kg/m²    Physical Exam  Constitutional:       General: She is not in acute distress.     Appearance: Normal appearance. She is obese.   HENT:      Head: Normocephalic and atraumatic.      Nose: Nose normal.      Mouth/Throat:      Mouth: Mucous membranes are moist.   Eyes:      Extraocular Movements: Extraocular movements intact.   Neck:      Thyroid: No thyroid mass, thyromegaly or thyroid tenderness.   Cardiovascular:      Rate and Rhythm: Normal rate and regular rhythm.   Pulmonary:      Effort: Pulmonary effort is normal.      Breath sounds: Normal breath sounds.   Musculoskeletal:         General: Normal range of motion.   Skin:     General: Skin is warm.   Neurological:      Mental Status: She is alert and oriented to person, place, and time.   Psychiatric:         Mood and Affect: Mood normal.       Lab Results   Component Value Date    TSH 3.56 10/11/2023    FREET4 1.03 10/11/2023       Assessment/Plan   78 year old female presents for follow up.    Hypothyroidism, adult  To obtain TFTs  To continue Levothyroxine 25mcg po daily 4 days per week  To continue Levothyroxine 50mcg po daily 3 days per week   Take levothyroxine on an empty stomach with water alone, 30-60 minutes before eating or taking other medications, 4 hours before any calcium or iron supplement    Adenoma of pituitary (Multi)  To obtain blood tests at 8AM          Screening for diabetes mellitus  To obtain A1C and BMP values         Weight gain  To restart exercise - 30 mins daily   No diet  is more superior with regards to weight loss     Will follow up with results    "

## 2024-10-11 ENCOUNTER — APPOINTMENT (OUTPATIENT)
Dept: ENDOCRINOLOGY | Facility: CLINIC | Age: 79
End: 2024-10-11
Payer: MEDICARE

## 2024-10-11 VITALS
WEIGHT: 203 LBS | DIASTOLIC BLOOD PRESSURE: 78 MMHG | HEIGHT: 65 IN | SYSTOLIC BLOOD PRESSURE: 120 MMHG | BODY MASS INDEX: 33.82 KG/M2 | HEART RATE: 72 BPM

## 2024-10-11 DIAGNOSIS — Z13.1 SCREENING FOR DIABETES MELLITUS: ICD-10-CM

## 2024-10-11 DIAGNOSIS — D35.2 ADENOMA OF PITUITARY (MULTI): ICD-10-CM

## 2024-10-11 DIAGNOSIS — R63.5 WEIGHT GAIN: ICD-10-CM

## 2024-10-11 DIAGNOSIS — R73.9 HYPERGLYCEMIA: ICD-10-CM

## 2024-10-11 DIAGNOSIS — E03.9 HYPOTHYROIDISM, ADULT: Primary | ICD-10-CM

## 2024-10-11 DIAGNOSIS — L68.0 HIRSUTISM: ICD-10-CM

## 2024-10-11 PROCEDURE — 1159F MED LIST DOCD IN RCRD: CPT | Performed by: INTERNAL MEDICINE

## 2024-10-11 PROCEDURE — 1160F RVW MEDS BY RX/DR IN RCRD: CPT | Performed by: INTERNAL MEDICINE

## 2024-10-11 PROCEDURE — 99214 OFFICE O/P EST MOD 30 MIN: CPT | Performed by: INTERNAL MEDICINE

## 2024-10-11 PROCEDURE — G2211 COMPLEX E/M VISIT ADD ON: HCPCS | Performed by: INTERNAL MEDICINE

## 2024-10-11 ASSESSMENT — ENCOUNTER SYMPTOMS
SHORTNESS OF BREATH: 1
HEADACHES: 1
BACK PAIN: 1
UNEXPECTED WEIGHT CHANGE: 1

## 2024-10-14 ENCOUNTER — LAB (OUTPATIENT)
Dept: LAB | Facility: LAB | Age: 79
End: 2024-10-14
Payer: MEDICARE

## 2024-10-14 DIAGNOSIS — E03.9 HYPOTHYROIDISM, ADULT: ICD-10-CM

## 2024-10-14 DIAGNOSIS — Z13.1 SCREENING FOR DIABETES MELLITUS: ICD-10-CM

## 2024-10-14 DIAGNOSIS — R73.9 HYPERGLYCEMIA: ICD-10-CM

## 2024-10-14 DIAGNOSIS — D35.2 ADENOMA OF PITUITARY (MULTI): ICD-10-CM

## 2024-10-14 DIAGNOSIS — R63.5 WEIGHT GAIN: ICD-10-CM

## 2024-10-14 DIAGNOSIS — L68.0 HIRSUTISM: ICD-10-CM

## 2024-10-14 LAB
ANION GAP SERPL CALC-SCNC: 12 MMOL/L (ref 10–20)
BUN SERPL-MCNC: 15 MG/DL (ref 6–23)
CALCIUM SERPL-MCNC: 9.3 MG/DL (ref 8.6–10.3)
CHLORIDE SERPL-SCNC: 106 MMOL/L (ref 98–107)
CO2 SERPL-SCNC: 29 MMOL/L (ref 21–32)
CORTIS SERPL-MCNC: 29.7 UG/DL (ref 2.5–20)
CREAT SERPL-MCNC: 1.02 MG/DL (ref 0.5–1.05)
DHEA-S SERPL-MCNC: 19 UG/DL (ref 13–130)
EGFRCR SERPLBLD CKD-EPI 2021: 56 ML/MIN/1.73M*2
EST. AVERAGE GLUCOSE BLD GHB EST-MCNC: 120 MG/DL
FSH SERPL-ACNC: 56.2 IU/L
GLUCOSE SERPL-MCNC: 106 MG/DL (ref 74–99)
HBA1C MFR BLD: 5.8 %
INSULIN P FAST SERPL-ACNC: 8 UIU/ML (ref 3–25)
LH SERPL-ACNC: 29.4 IU/L
POTASSIUM SERPL-SCNC: 4 MMOL/L (ref 3.5–5.3)
PROLACTIN SERPL-MCNC: 12.1 UG/L (ref 3–20)
SODIUM SERPL-SCNC: 143 MMOL/L (ref 136–145)
T4 FREE SERPL-MCNC: 1.15 NG/DL (ref 0.61–1.12)
TSH SERPL-ACNC: 5.51 MIU/L (ref 0.44–3.98)

## 2024-10-14 PROCEDURE — 84146 ASSAY OF PROLACTIN: CPT

## 2024-10-14 PROCEDURE — 82533 TOTAL CORTISOL: CPT

## 2024-10-14 PROCEDURE — 83525 ASSAY OF INSULIN: CPT

## 2024-10-14 PROCEDURE — 84305 ASSAY OF SOMATOMEDIN: CPT

## 2024-10-14 PROCEDURE — 82627 DEHYDROEPIANDROSTERONE: CPT

## 2024-10-14 PROCEDURE — 80048 BASIC METABOLIC PNL TOTAL CA: CPT

## 2024-10-14 PROCEDURE — 83036 HEMOGLOBIN GLYCOSYLATED A1C: CPT

## 2024-10-14 PROCEDURE — 84402 ASSAY OF FREE TESTOSTERONE: CPT

## 2024-10-14 PROCEDURE — 83002 ASSAY OF GONADOTROPIN (LH): CPT

## 2024-10-14 PROCEDURE — 84439 ASSAY OF FREE THYROXINE: CPT

## 2024-10-14 PROCEDURE — 83001 ASSAY OF GONADOTROPIN (FSH): CPT

## 2024-10-14 PROCEDURE — 36415 COLL VENOUS BLD VENIPUNCTURE: CPT

## 2024-10-14 PROCEDURE — 84443 ASSAY THYROID STIM HORMONE: CPT

## 2024-10-14 PROCEDURE — 82024 ASSAY OF ACTH: CPT

## 2024-10-16 PROBLEM — Z13.1 SCREENING FOR DIABETES MELLITUS: Status: ACTIVE | Noted: 2024-10-16

## 2024-10-16 PROBLEM — R63.5 WEIGHT GAIN: Status: ACTIVE | Noted: 2024-10-16

## 2024-10-16 LAB
ACTH PLAS-MCNC: 47 PG/ML (ref 7.2–63.3)
IGF-I SERPL-MCNC: 177 NG/ML (ref 18–200)
IGF-I Z-SCORE SERPL: 1.5

## 2024-10-17 NOTE — ASSESSMENT & PLAN NOTE
To restart exercise - 30 mins daily   No diet  is more superior with regards to weight loss     Will follow up with results

## 2024-10-17 NOTE — ASSESSMENT & PLAN NOTE
To obtain TFTs  To continue Levothyroxine 25mcg po daily 4 days per week  To continue Levothyroxine 50mcg po daily 3 days per week   Take levothyroxine on an empty stomach with water alone, 30-60 minutes before eating or taking other medications, 4 hours before any calcium or iron supplement

## 2024-10-19 LAB
TESTOSTERONE FREE (CHAN): 1.4 PG/ML (ref 0.2–3.7)
TESTOSTERONE,TOTAL,LC-MS/MS: 17 NG/DL (ref 2–45)

## 2024-10-20 DIAGNOSIS — D35.2 ADENOMA OF PITUITARY (MULTI): Primary | ICD-10-CM

## 2024-10-20 DIAGNOSIS — R79.89 ELEVATED CORTISOL LEVEL: ICD-10-CM

## 2024-10-20 NOTE — RESULT ENCOUNTER NOTE
Labs have been reviewed.  The thyroid and cortisol levels are elevated.   The A1C is in the beginning of the prediabetic range.  Please undergo 24 hour urinary collection to ensure there is no cortisol excess as this can lead to weight gain.

## 2024-10-29 ENCOUNTER — TELEPHONE (OUTPATIENT)
Dept: ENDOCRINOLOGY | Facility: CLINIC | Age: 79
End: 2024-10-29
Payer: MEDICARE

## 2024-11-04 ENCOUNTER — LAB (OUTPATIENT)
Dept: LAB | Facility: LAB | Age: 79
End: 2024-11-04
Payer: MEDICARE

## 2024-11-04 DIAGNOSIS — R79.89 ELEVATED CORTISOL LEVEL: ICD-10-CM

## 2024-11-04 DIAGNOSIS — D35.2 ADENOMA OF PITUITARY (MULTI): ICD-10-CM

## 2024-11-04 LAB
COLLECT DURATION TIME SPEC: 24 HRS
CREAT 24H UR-MCNC: 51.1 MG/DL (ref 20–320)
CREAT 24H UR-MRATE: 1.1 G/24 H (ref 0.67–1.59)
SPECIMEN VOL 24H UR: 2150 ML

## 2024-11-04 PROCEDURE — 81050 URINALYSIS VOLUME MEASURE: CPT

## 2024-11-04 PROCEDURE — 82570 ASSAY OF URINE CREATININE: CPT

## 2024-11-04 PROCEDURE — 82530 CORTISOL FREE: CPT

## 2024-11-08 LAB
ANNOTATION COMMENT IMP: NORMAL
COLLECT DURATION TIME SPEC: 24 HR
CORTIS F 24H UR HPLC-MCNC: 15.6 UG/L
CORTIS F 24H UR-MRATE: 33.5 UG/D
CORTIS F/CREAT 24H UR: 29.43 UG/G CRT
CREAT 24H UR-MRATE: 1140 MG/D (ref 400–1300)
CREAT UR-MCNC: 53 MG/DL
SPECIMEN VOL ?TM UR: 2150 ML

## 2025-02-27 NOTE — TELEPHONE ENCOUNTER
Ms. Alvarez called in because she said with taking the Synthroid and she said that her hair is starting to fall out and she would like to take Levothyroxine instead. Waiting on PA.

## 2025-02-28 DIAGNOSIS — E03.9 HYPOTHYROIDISM, ADULT: Primary | ICD-10-CM

## 2025-02-28 RX ORDER — LEVOTHYROXINE SODIUM 25 UG/1
50 TABLET ORAL DAILY
Qty: 90 TABLET | Refills: 1 | Status: SHIPPED | OUTPATIENT
Start: 2025-02-28

## 2025-02-28 RX ORDER — LEVOTHYROXINE SODIUM 25 UG/1
25 TABLET ORAL
Qty: 90 TABLET | Refills: 1 | Status: SHIPPED | OUTPATIENT
Start: 2025-03-01 | End: 2025-02-28 | Stop reason: CLARIF

## 2025-02-28 RX ORDER — LEVOTHYROXINE SODIUM 25 UG/1
50 TABLET ORAL DAILY
Qty: 90 TABLET | Refills: 1 | Status: SHIPPED | OUTPATIENT
Start: 2025-02-28 | End: 2025-02-28 | Stop reason: CLARIF

## 2025-02-28 RX ORDER — LEVOTHYROXINE SODIUM 25 UG/1
25 TABLET ORAL
Qty: 90 TABLET | Refills: 1 | Status: SHIPPED | OUTPATIENT
Start: 2025-03-01 | End: 2025-08-28

## 2025-03-04 DIAGNOSIS — E03.9 HYPOTHYROIDISM, ADULT: ICD-10-CM

## 2025-03-04 RX ORDER — LEVOTHYROXINE SODIUM 25 UG/1
TABLET ORAL
Qty: 108 TABLET | Refills: 1 | Status: SHIPPED | OUTPATIENT
Start: 2025-03-04

## 2025-04-22 ENCOUNTER — HOSPITAL ENCOUNTER (OUTPATIENT)
Dept: RADIOLOGY | Facility: CLINIC | Age: 80
Discharge: HOME | End: 2025-04-22
Payer: MEDICARE

## 2025-04-22 ENCOUNTER — APPOINTMENT (OUTPATIENT)
Dept: ORTHOPEDIC SURGERY | Facility: CLINIC | Age: 80
End: 2025-04-22
Payer: MEDICARE

## 2025-04-22 VITALS — HEIGHT: 65 IN | WEIGHT: 196.4 LBS | BODY MASS INDEX: 32.72 KG/M2

## 2025-04-22 DIAGNOSIS — M25.562 ACUTE BILATERAL KNEE PAIN: ICD-10-CM

## 2025-04-22 DIAGNOSIS — R26.9 GAIT ABNORMALITY: ICD-10-CM

## 2025-04-22 DIAGNOSIS — Z96.652 STATUS POST LEFT KNEE REPLACEMENT: ICD-10-CM

## 2025-04-22 DIAGNOSIS — M25.561 ACUTE BILATERAL KNEE PAIN: ICD-10-CM

## 2025-04-22 DIAGNOSIS — M17.11 PRIMARY OSTEOARTHRITIS OF RIGHT KNEE: Primary | ICD-10-CM

## 2025-04-22 DIAGNOSIS — R29.898 DECREASED STRENGTH INVOLVING KNEE JOINT: ICD-10-CM

## 2025-04-22 PROCEDURE — 1036F TOBACCO NON-USER: CPT | Performed by: ORTHOPAEDIC SURGERY

## 2025-04-22 PROCEDURE — 1159F MED LIST DOCD IN RCRD: CPT | Performed by: ORTHOPAEDIC SURGERY

## 2025-04-22 PROCEDURE — 99204 OFFICE O/P NEW MOD 45 MIN: CPT | Performed by: ORTHOPAEDIC SURGERY

## 2025-04-22 PROCEDURE — 73564 X-RAY EXAM KNEE 4 OR MORE: CPT | Mod: BILATERAL PROCEDURE | Performed by: RADIOLOGY

## 2025-04-22 PROCEDURE — 73564 X-RAY EXAM KNEE 4 OR MORE: CPT | Mod: 50

## 2025-04-22 ASSESSMENT — PAIN - FUNCTIONAL ASSESSMENT: PAIN_FUNCTIONAL_ASSESSMENT: NO/DENIES PAIN

## 2025-04-22 NOTE — PROGRESS NOTES
PRIMARY CARE PHYSICIAN: Piero Whitaker MD  REFERRING PROVIDER: No referring provider defined for this encounter.     CONSULT ORTHOPAEDIC: Knee Evaluation        ASSESSMENT & PLAN    IMPRESSION:   History of a Left Knee Arthroplasty done in 2021  Primary arthritis, right knee    PLAN:   Regarding the left knee we reviewed her findings above.  At this point there is no obvious gross loosening of her implant.  She has no signs or symptoms of prosthetic joint infection or clinical instability.  At minimum I did recommend getting an ESR and CRP to rule out prosthetic joint infection as a source of her pain.  I did would like to also get a bone scan of her left knee due to her symptoms of start up pain to rule out implant loosening of the tibial component as a source of her pain.  Her symptoms started after an injury and while she did have broken ribs at the time she has had no formal therapy for the left knee if she is unable to participate on this due to her broken ribs.  Will restart formal physical therapy for conditioning and will follow-up with her after the above is completed if she needs any additional treatment modalities.  Regarding the right knee she is doing well with occasional over-the-counter anti-inflammatory medications so we discussed continued conservative care and may consider corticosteroid junction the future if necessary.      SUBJECTIVE  CHIEF COMPLAINT:   Chief Complaint   Patient presents with    Left Knee - Pain    Right Knee - Pain        HPI: Yaneth Alvarez is a 79 y.o. patient. Yaneth Alvarez has had progressive problems with their both knees over the past  1.5  years. Left knee is worse. They do report any trauma. Hx of a fall when they missed a step and fell onto the knees on 11/2023. Has had no formal tx for left knee since they fell. Worse with sitting for periods of time. Pain improves with activity but at some point gets worse with increased activity. They do report any constant  or progressive numbness or tingling in their legs. Their symptoms are interfering with activities which include left knee: pain all around the knee which travels down the leg, paresthesia in the lower leg, and swelling, and right knee: intermittent anterior sided knee pain, crepitus, some swelling, and baker cysts. XR done today.      FUNCTIONAL STATUS: occasionally limited.  AMBULATORY STATUS:  independent  PREVIOUS TREATMENTS: NSAIDS Motrin with good improvement, still taking  Cortisone injection L knee before 2021 with no improvement  Other injection gel injection before 2021 with no improvement  Therapy Multiple rounds with the last being in 2021 completed, doing home exercises  Surgery L TKA done in 2021 with improvement until 11/2023 after their fall onto their knees  Analgesics Tylenol, still taking, with good improvement    Prednisone, with good improvement, they do get pneumonia a lot and get this prescribed to the,   HISTORY OF SURGERY ON AFFECTED KNEE(S): Yes, L TKA done in 2021      REVIEW OF SYSTEMS  Constitutional: See HPI for pain assessment, No significant weight loss, recent trauma  Cardiovascular: No chest pain, shortness of breath  Respiratory: No difficulty breathing, cough  Gastrointestinal: No nausea, vomiting, diarrhea, constipation  Musculoskeletal: Noted in HPI, positive for pain, restricted motion, stiffness  Integumentary: No rashes, easy bruising, redness   Neurological: no numbness or tingling in extremities, no gait disturbances   Psychiatric: No mood changes, memory changes, social issues  Heme/Lymph: no excessive swelling, easy bruising, excessive bleeding  ENT: No hearing changes  Eyes: No vision changes    Medical History[1]     Allergies[2]     Surgical History[3]     Family History[4]     Social History     Socioeconomic History    Marital status:      Spouse name: Not on file    Number of children: Not on file    Years of education: Not on file    Highest education level:  "Not on file   Occupational History    Not on file   Tobacco Use    Smoking status: Former     Types: Cigarettes    Smokeless tobacco: Never   Substance and Sexual Activity    Alcohol use: Yes     Comment: very rarely on occasion; 1-3 times/year    Drug use: Never    Sexual activity: Not on file   Other Topics Concern    Not on file   Social History Narrative    Not on file     Social Drivers of Health     Financial Resource Strain: Low Risk  (10/16/2023)    Overall Financial Resource Strain (CARDIA)     Difficulty of Paying Living Expenses: Not very hard   Food Insecurity: Not on file   Transportation Needs: No Transportation Needs (10/16/2023)    PRAPARE - Transportation     Lack of Transportation (Medical): No     Lack of Transportation (Non-Medical): No   Physical Activity: Not on file   Stress: Not on file   Social Connections: Not on file   Intimate Partner Violence: Not on file   Housing Stability: Low Risk  (10/16/2023)    Housing Stability Vital Sign     Unable to Pay for Housing in the Last Year: No     Number of Places Lived in the Last Year: 1     Unstable Housing in the Last Year: No        CURRENT MEDICATIONS:   Current Medications[5]     OBJECTIVE    PHYSICAL EXAM      10/17/2023     3:25 PM 10/17/2023     9:03 PM 10/18/2023     6:39 AM 10/18/2023     9:14 AM 10/18/2023     1:02 PM 10/11/2024     9:39 AM 4/22/2025     9:48 AM   Vitals   Systolic 113 136 120 147 120 120    Diastolic 73 78 78 83 78 78    BP Location Right arm Right arm Right arm Left arm Left arm Left arm    Heart Rate 65 61 73 66 64 72    Temp 36.3 °C (97.3 °F) 37.1 °C (98.8 °F) 36.2 °C (97.2 °F) 35.9 °C (96.7 °F) 35.9 °C (96.7 °F)     Resp 16 18 18 18 16     Height      1.638 m (5' 4.5\") 1.651 m (5' 5\")   Weight (lb)      203 196.4   BMI      34.31 kg/m2 32.68 kg/m2   BSA (m2)      2.05 m2 2.02 m2   Visit Report      Report Report      Body mass index is 32.68 kg/m².    GENERAL: A/Ox3, NAD. Appears healthy, well nourished  PSYCHIATRIC: " Mood stable, appropriate memory recall  EYES: EOM intact, no scleral icterus  CARDIAC: regular rate  LUNGS: Breathing non-labored  SKIN: no erythema, rashes, or ecchymoses     MUSCULOSKELETAL:  Laterality: right Knee Exam  - Alignment: partial correctible valgus deformity  - ROM: 5 to 115 degrees  - Effusion: none  - Strength: knee extension and flexion 5/5, EHL/PF/DF motor intact  - Palpation: TTP along  posterior lateral  joint line  - Stability: Anterior/Posterior stable, varus/valgus stable  - Gait: normal  - Hip Exam: flexion to 100+ degrees, full extension, internal/external rotation adequate, and no pain with log roll  - Special Tests: none performed    Laterality: left knee  - Incision: Well-healed, no overlying erythema or overt warmth compared to the contralateral side  - ROM: 5 to 115 degrees  - Palpation: Tender palpation along medial lateral joint line and along proximal tibia  -Stable to varus and valgus stress, negative piston test  -No pain in the hip with internal or external rotation  - compartments soft, negative homans  - can active straight leg raise with no extensor lag    NEUROVASCULAR:  - Neurovascular Status: sensation intact to light touch distally  - Capillary refill brisk at extremities, Bilateral dorsalis pedis pulse 2+     IMAGING:  Multiple views of the affected bilateral knee(s) demonstrate: Severe arthritis of right knee in the lateral compartment complete joint space narrowing, subchondral sclerosis, marginal osteophytic change and valgus deformity.  Left knee with cemented total knee arthroplasty with no obvious loosening or failure with lucencies noted diffusely along the cement mantle of the tibial baseplate.   X-rays were personally reviewed and interpreted by me.  Radiology reports were reviewed by me as well, if readily available at the time.        Rin Whittaker DO  Attending Surgeon  Joint Replacement and Adult Reconstructive Surgery  [facility],  [place]                                [1]   Past Medical History:  Diagnosis Date    Acute embolism and thrombosis of unspecified deep veins of unspecified lower extremity     Acute deep vein thrombosis (DVT)    Acute respiratory failure with hypoxia     Acute respiratory failure with hypoxemia    Age-related nuclear cataract, unspecified eye     Nuclear senile cataract    Chronic obstructive pulmonary disease, unspecified     COPD, moderate    COVID-19     COVID-19    Migraine, unspecified, not intractable, without status migrainosus     Migraine    Nontoxic multinodular goiter     Nontoxic multinodular goiter    Personal history of colonic polyps     History of colonic polyps    Personal history of malignant neoplasm of other parts of uterus     History of malignant neoplasm of uterus    Personal history of other benign neoplasm     History of pituitary adenoma    Personal history of other diseases of the circulatory system     History of congestive heart failure    Personal history of other diseases of the circulatory system     History of pulmonary hypertension    Personal history of other diseases of the circulatory system     History of varicose veins of lower extremity    Personal history of other diseases of the respiratory system     History of allergic rhinitis    Personal history of other diseases of the respiratory system     History of asthma    Personal history of other endocrine, nutritional and metabolic disease     History of acidosis    Personal history of other endocrine, nutritional and metabolic disease     History of vitamin D deficiency    Personal history of other endocrine, nutritional and metabolic disease     History of familial combined hyperlipidemia    Personal history of other mental and behavioral disorders     History of anxiety    Personal history of other specified conditions     H/O multiple pulmonary nodules    Personal history of other specified conditions     History of gait  disorder    Personal history of pneumonia (recurrent)     History of pneumonia    Solitary pulmonary nodule     Pulmonary nodule    Spondylosis without myelopathy or radiculopathy, lumbar region     Spondylosis of lumbar spine    Unilateral primary osteoarthritis, left knee     Arthritis of left knee    Unilateral primary osteoarthritis, right knee     Arthritis of right knee    Unspecified osteoarthritis, unspecified site     Osteoarthritis   [2] No Known Allergies  [3]   Past Surgical History:  Procedure Laterality Date    OTHER SURGICAL HISTORY  10/07/2022    Knee replacement    OTHER SURGICAL HISTORY  10/07/2022    Facet joint injection    OTHER SURGICAL HISTORY  10/07/2022    Sigmoidoscopy    OTHER SURGICAL HISTORY  10/07/2022    Colonoscopy   [4] No family history on file.  [5]   Current Outpatient Medications   Medication Sig Dispense Refill    albuterol 90 mcg/actuation inhaler Inhale 2 puffs every 4 hours if needed.      atorvastatin (Lipitor) 10 mg tablet Take 1 tablet (10 mg) by mouth 2 times a week. Mondays and Fridays      cholecalciferol (Thera-D) 50 MCG (2000 UT) tablet Take 2.5 tablets (5,000 Units) by mouth once daily.      fluticasone-umeclidin-vilanter (Trelegy Ellipta) 200-62.5-25 mcg blister with device Inhale 1 puff once daily.      levothyroxine (Synthroid, Levoxyl) 25 mcg tablet Take 1 tablet daily for 4 days per week.  Take 2 tablets daily for 3 days per week 108 tablet 1    montelukast (Singulair) 10 mg tablet Take 1 tablet (10 mg) by mouth.      ipratropium-albuteroL (Duo-Neb) 0.5-2.5 mg/3 mL nebulizer solution Inhale 3 mL 4 times a day as needed. (Patient not taking: Reported on 4/22/2025)       No current facility-administered medications for this visit.

## 2025-04-23 NOTE — PROGRESS NOTES
"Subjective   Ms. Alvarez is an 79-year-old female who presents for follow up for a pituitary adenoma as well as hypothyroidism in 2001.      The patient states that she had a pituitary adenoma which was followed for 10 to 20 years. Her previous endocrinologist was Dr. Leblanc in [place].     Her last MRI from March 15, 2019 did not reveal any pituitary adenomas. She previously reports that she had a 6 mm lesion on a previous MRI of the brain.      The patient previously had hypothyroidism and she states that she never felt fatigued even after working 2 jobs. She states that she had radioactive iodine treatment and recalls that in 2001 she developed hair loss, weight gain and fatigue.     Family history:  Sister - total thyroidectomy   Daughter - hypothyroidism      Current Regimen  Levothyroxine 25mcg po daily 4 days per week  Levothyroxine 50mcg po daily 3 days per week      Symptoms are as listed below:  Energy level -  improved    Sleep - disrupted by GERD occasionally   Temperature intolerances -  chronically cold but more warm during the day  Bowel movements -  regular; daily; normal consistency    Skin changes - denies   Hair changes - denies; had hair loss with Synthroid  Tremors - denies  Palpitations - denies   Weight change - lost due to changes in March 2025 - does not eat carbs/sugars and limited portion sizes   Headaches - denies  Vision changes- denies       Exercise regimen - stationary bike; will start PT today     Review of Systems   Constitutional:  Positive for unexpected weight change (Weight loss).   Musculoskeletal:  Positive for arthralgias (Knees).   All other systems reviewed and are negative.        Objective   /82   Pulse 64   Ht 1.651 m (5' 5\")   Wt 88.3 kg (194 lb 9.6 oz)   BMI 32.38 kg/m²    Physical Exam  Constitutional:       General: She is not in acute distress.     Appearance: Normal appearance. She is obese.   HENT:      Head: Normocephalic and atraumatic.      Nose: " Nose normal.      Mouth/Throat:      Mouth: Mucous membranes are moist.   Eyes:      Extraocular Movements: Extraocular movements intact.   Neck:      Thyroid: No thyroid mass, thyromegaly or thyroid tenderness.   Cardiovascular:      Rate and Rhythm: Normal rate and regular rhythm.   Pulmonary:      Effort: Pulmonary effort is normal.      Breath sounds: Normal breath sounds.   Musculoskeletal:         General: Normal range of motion.   Skin:     General: Skin is warm.   Neurological:      Mental Status: She is alert and oriented to person, place, and time.      Comments: No tremors to outstretched hands     Psychiatric:         Mood and Affect: Mood normal.         Lab Results   Component Value Date    TSH 5.51 (H) 10/14/2024    FREET4 1.15 (H) 10/14/2024     Lab Results   Component Value Date    CORTISOL 29.7 (H) 10/14/2024    ACTH 47.0 10/14/2024    TSH 5.51 (H) 10/14/2024    FREET4 1.15 (H) 10/14/2024    FSH 56.2 10/14/2024    LH 29.4 10/14/2024    PROLACTIN 12.1 10/14/2024        Assessment/Plan   79 year old female presents for follow up.    Hypothyroidism, adult  To obtain blood test  To continue Levothyroxine 25mcg po daily 4 days per week  To continue Levothyroxine 50mcg po daily 3 days per week   Take levothyroxine on an empty stomach with water alone, 30-60 minutes before eating or taking other medications, 4 hours before any calcium or iron supplement  Counseled that thyroid requirements will be decreased with weight loss   Counseled regarding the symptoms of thyrotoxicosis should further weight loss occur  For follow up in 6 months

## 2025-04-23 NOTE — PATIENT INSTRUCTIONS
Thank you for choosing [facility]  for your health care needs.  If you have any questions, concerns or medical needs, please feel free to contact our office at [phone].    Please ensure you complete your blood work one week before the next scheduled appointment.    To obtain blood test  To continue Levothyroxine 25mcg po daily 4 days per week  To continue Levothyroxine 50mcg po daily 3 days per week   Take levothyroxine on an empty stomach with water alone, 30-60 minutes before eating or taking other medications, 4 hours before any calcium or iron supplement  To restart exercise - 30 mins daily   Counseled that thyroid requirements will be decreased with weight loss   Counseled regarding the symptoms of thyrotoxicosis should further weight loss occur  For follow up in 6 months

## 2025-04-24 ENCOUNTER — APPOINTMENT (OUTPATIENT)
Dept: ENDOCRINOLOGY | Facility: CLINIC | Age: 80
End: 2025-04-24
Payer: MEDICARE

## 2025-04-24 ENCOUNTER — EVALUATION (OUTPATIENT)
Dept: PHYSICAL THERAPY | Facility: CLINIC | Age: 80
End: 2025-04-24
Payer: MEDICARE

## 2025-04-24 VITALS
WEIGHT: 194.6 LBS | BODY MASS INDEX: 32.42 KG/M2 | HEIGHT: 65 IN | DIASTOLIC BLOOD PRESSURE: 82 MMHG | HEART RATE: 64 BPM | SYSTOLIC BLOOD PRESSURE: 128 MMHG

## 2025-04-24 DIAGNOSIS — R29.898 DECREASED STRENGTH INVOLVING KNEE JOINT: Primary | ICD-10-CM

## 2025-04-24 DIAGNOSIS — E03.9 HYPOTHYROIDISM, ADULT: Primary | ICD-10-CM

## 2025-04-24 DIAGNOSIS — R26.9 GAIT ABNORMALITY: ICD-10-CM

## 2025-04-24 PROCEDURE — 97110 THERAPEUTIC EXERCISES: CPT | Mod: GP | Performed by: PHYSICAL THERAPIST

## 2025-04-24 PROCEDURE — G2211 COMPLEX E/M VISIT ADD ON: HCPCS | Performed by: INTERNAL MEDICINE

## 2025-04-24 PROCEDURE — 97161 PT EVAL LOW COMPLEX 20 MIN: CPT | Mod: GP | Performed by: PHYSICAL THERAPIST

## 2025-04-24 PROCEDURE — 99213 OFFICE O/P EST LOW 20 MIN: CPT | Performed by: INTERNAL MEDICINE

## 2025-04-24 PROCEDURE — 1159F MED LIST DOCD IN RCRD: CPT | Performed by: INTERNAL MEDICINE

## 2025-04-24 PROCEDURE — 1160F RVW MEDS BY RX/DR IN RCRD: CPT | Performed by: INTERNAL MEDICINE

## 2025-04-24 RX ORDER — MULTIVIT-MIN/IRON/FOLIC ACID/K 18-600-40
CAPSULE ORAL AS NEEDED
COMMUNITY

## 2025-04-24 ASSESSMENT — ENCOUNTER SYMPTOMS
OCCASIONAL FEELINGS OF UNSTEADINESS: 0
ARTHRALGIAS: 1
LOSS OF SENSATION IN FEET: 0
DEPRESSION: 0
UNEXPECTED WEIGHT CHANGE: 1

## 2025-04-24 ASSESSMENT — PATIENT HEALTH QUESTIONNAIRE - PHQ9
SUM OF ALL RESPONSES TO PHQ9 QUESTIONS 1 AND 2: 0
2. FEELING DOWN, DEPRESSED OR HOPELESS: NOT AT ALL
1. LITTLE INTEREST OR PLEASURE IN DOING THINGS: NOT AT ALL

## 2025-04-24 ASSESSMENT — PAIN SCALES - GENERAL: PAINLEVEL_OUTOF10: 2

## 2025-04-24 ASSESSMENT — PAIN - FUNCTIONAL ASSESSMENT: PAIN_FUNCTIONAL_ASSESSMENT: 0-10

## 2025-04-24 NOTE — PROGRESS NOTES
"  Physical Therapy    Physical Therapy Lower Extremity Evaluation    Patient Name: Yaneth Alvarez \"Jazmin\"  MRN: [id number]  : 1945   Today's Date: 2025  Time Calculation  Start Time: 315  Stop Time: 0  Time Calculation (min): 55 min  PT Evaluation Time Entry  PT Evaluation (Low) Time Entry: 30  PT Therapeutic Procedures Time Entry  Therapeutic Exercise Time Entry: 15        Non-Billable Time  Non-billable time: 10  Non-billable time reason: CP    Visit: 1  Auth: needs auth    Current Problem  Problem List Items Addressed This Visit           ICD-10-CM    Decreased strength involving knee joint - Primary R29.898    Relevant Orders    Follow Up In Physical Therapy    Gait abnormality R26.9    Relevant Orders    Follow Up In Physical Therapy        General  Name and  confirmed with patient on this date.       Ambulatory Screening Summary     Screening  Frequency  Date Last Completed   Spiritual and Cultural Beliefs   Screening  each visit or episode of care 2025   Falls Risk Screening  every ambulatory visit 2025  3:38 PM   Pain Screening  annually at primary care visit  2020   Domestic Violence screening  annually at primary care visit    Elder Abuse Screening  annually at primary care visit    Depression Screening  annually in the primary care setting 2025   Suicide Risk Screening  annually in the primary care setting 10/16/2023   Nutrition and Food Insecurity   Screening  at least annually at primary care visit     Key Learner  annually in the primary care setting 2025   Drug Screen  2025  9:52 AM   Alcohol Screen  2025  9:52 AM   Advance Directive           Precautions  Precautions  STEADI Fall Risk Score (The score of 4 or more indicates an increased risk of falling): 2       Pain  Pain Assessment: 0-10  0-10 (Numeric) Pain Score: 2  Pain Location: Knee  Pain Orientation: Left    SUBJECTIVE:   Chief complaint:  Pt is an 78 yo female who presents with " complaint of bilat knee pain, left > right, over the past 1.5 years. Pt did have a fall in 11/2023 when she missed a step and fell onto her knees. Pt had a left TKA in 2021. Per Dr. Whittaker's note, no obvious gross loosening of implant, but he is ordering ESR and CRP to rule out infection and bone scan to r/o loosening of tibial component. Pt reports her right knee is bone on bone. Pt has not been exercising due to pain in her knee and in her ribs which she fractured in 11/2023. She belongs to [place] [employer] and [employer]. Has swelling in bilat feet.    Pain Better: motrin  Pain Worse: sit to stand, night time  Imaging: advanced OA right knee, left TKA WNL  Home setup: Ranch home  Work: retired  Patients goal: Decrease knee pain and return to normal function    OBJECTIVE:    Lower Extremity Strength: (5/5 unless noted)  MMT RIGHT LEFT   Hip Flexion     Hip Extension     Hip Abduction     Hip Adduction     Knee Extension 4+/5 3+/5 pain   Knee Flexion 4+/5 4/5   Ankle DF     Ankle PF     Ankle INV     Ankle EV       Lower Extremity ROM: WNL unless documented below:  ROM in Degrees  RIGHT LEFT   Hip Flexion     Hip Extension     Hip Abduction     Hip Adduction     Knee Extension -5 from neutral 0   Knee Flexion 123 123   Ankle DF     Ankle PF     Ankle INV     Ankle EV           Gait mechanics: Pt ambulates without device, right knee valgus  Palpation: tender along left tibia  Neurological symptoms: none      Functional Outcome:   Other Measures  Lower Extremity Funtional Score (LEFS): 27    TREATMENT:  EXERCISES Date  Date Date Date    REPS REPS REPS REPS          Nustep L3  6 mins      Bike              Shuttle  DLP 4B  3 X 10      Shuttle SLP 3B  3 X 10      Shuttle TR/HR              Qhip Flexion       Qhip Extension       Qhip Abduction       Qhip  TKE              Q Quad       Q Hamstring                                                                                                                ASSESSEMENT  Pt is a 79 y.o. referred to physical therapy for a dx of decreased strength of LE's and altered gait by Rin Whittaker DO . Pt presents with decreased left LE strength and altered gait. This pt would benefit from a therapy program to restore prior level of function, reduce pain, increase AROM, increase strength, and improve gait and balance.     The physical therapy prognosis is good for the patient to achieve their goals.   The pt tolerated therapy treatment today well with no adverse effects.  Barriers to therapy include:  severe right knee OA    PLAN       The pt has been educated about the risks and benefits of physical therapy and gives consent for treatment.     The patient will benefit from physical therapy treatment to include:  therapeutic exercise, manual therapy, gait training, balance training    Goals: STG's  Improve left LE strength so that patient can perform sit to stand without using Ue's-4 weeks  Pt will be able to perform sit to stand without left knee pain-4 weeks    LTG's  Pt will be able to walk community distances without knee pain-8 weeks  Pt will ambulate without deviation or compensation-8 weeks  Pt. Will be able to ascend/descend stairs with reciprocal pattern and no compensation-8 weeks  Improve LEFS score > 45 to faciliate return to prior level of function-8 weeks    [employer] Authorization Information    CPT CODES:  THERE-EX  (00785), MANUAL ([id number]), and GAIT ([id number])  Functional outcome tools: LEFS  Raw Score: 27  Is this request to provide: None of the above  Evaluation complextiy: Low  Did the patient have a surgical procedure in the last three months related to the condition for which services are being requested: No  Select all conditions that apply: Musculoskeletal disorders

## 2025-04-25 LAB
CRP SERPL-MCNC: <3 MG/L
ERYTHROCYTE [SEDIMENTATION RATE] IN BLOOD BY WESTERGREN METHOD: 9 MM/H

## 2025-04-27 RX ORDER — LEVOTHYROXINE SODIUM 25 UG/1
TABLET ORAL
Qty: 108 TABLET | Refills: 1 | Status: SHIPPED | OUTPATIENT
Start: 2025-04-27

## 2025-04-27 NOTE — ASSESSMENT & PLAN NOTE
To obtain blood test  To continue Levothyroxine 25mcg po daily 4 days per week  To continue Levothyroxine 50mcg po daily 3 days per week   Take levothyroxine on an empty stomach with water alone, 30-60 minutes before eating or taking other medications, 4 hours before any calcium or iron supplement  Counseled that thyroid requirements will be decreased with weight loss   Counseled regarding the symptoms of thyrotoxicosis should further weight loss occur  For follow up in 6 months

## 2025-04-29 ENCOUNTER — TREATMENT (OUTPATIENT)
Dept: PHYSICAL THERAPY | Facility: CLINIC | Age: 80
End: 2025-04-29
Payer: MEDICARE

## 2025-04-29 DIAGNOSIS — R29.898 DECREASED STRENGTH INVOLVING KNEE JOINT: Primary | ICD-10-CM

## 2025-04-29 DIAGNOSIS — R26.9 GAIT ABNORMALITY: ICD-10-CM

## 2025-04-29 PROCEDURE — 97110 THERAPEUTIC EXERCISES: CPT | Mod: GP,CQ | Performed by: PHYSICAL THERAPY ASSISTANT

## 2025-04-29 ASSESSMENT — PAIN SCALES - GENERAL: PAINLEVEL_OUTOF10: 2

## 2025-04-29 ASSESSMENT — PAIN - FUNCTIONAL ASSESSMENT: PAIN_FUNCTIONAL_ASSESSMENT: 0-10

## 2025-04-29 NOTE — PROGRESS NOTES
"Physical Therapy    Physical Therapy Treatment    Patient Name: Yaneth Alvarez  MRN: [id number]  : 1945  Today's Date: 2025  Time Calculation  Start Time: 1145  Stop Time: 1240  Time Calculation (min): 55 min    PT Therapeutic Procedures Time Entry  Therapeutic Exercise Time Entry: 45  PT Modalities Time Entry  Hot/Cold Pack Time Entry: 10       VISIT:# 2  Approved 7 visits  25 ~ 25  Current Problem  Problem List Items Addressed This Visit           ICD-10-CM    Decreased strength involving knee joint - Primary R29.898    Gait abnormality R26.9        Subjective    Patient reports Ramez knee pain persists. Patient reports some edema noted in ramez ankles intermittently.      Pain  Pain Assessment: 0-10  0-10 (Numeric) Pain Score: 2  Pain Location: Knee  Pain Orientation: Left       Objective          Lower extremity strength:  Knee Extension 4+/5 3+/5 pain   Knee Flexion 4+/5 4/5          Precautions  Precautions  STEADI Fall Risk Score (The score of 4 or more indicates an increased risk of falling): 2      Treatments:     XERCISES Date 25 Date 25 Date Date      Visit #1/                 Nustep L3  6 mins  L3 x 10 min       Bike                       Shuttle  DLP 4B  3 X 10  5B 3 x 10       Shuttle SLP 3B  3 X 10  4B 3 x 10        Shuttle TR/HR    4B 3 x 10                   Qhip Flexion    40# 2 x 10       Qhip Extension    40# 2 x 10       Qhip Abduction    30# 2 x 10       Qhip  TKE                       Q Quad    20# 2x 10, 1 x 5       Q Hamstring     35# 3 x 10                    Step ups     6\" x 10 each                                                                                                                               CP    10' Ramez                                          Assessment:   Patient completed initial session without reports of increased knee pain.       Plan:    Assess response to initial session and progress LE strengthening program to improve " transfers/gait.     Goals: STG's  Improve left LE strength so that patient can perform sit to stand without using Ue's-4 weeks  Pt will be able to perform sit to stand without left knee pain-4 weeks     LTG's  Pt will be able to walk community distances without knee pain-8 weeks  Pt will ambulate without deviation or compensation-8 weeks  Pt. Will be able to ascend/descend stairs with reciprocal pattern and no compensation-8 weeks  Improve LEFS score > 45 to faciliate return to prior level of function-8 weeks

## 2025-04-30 ENCOUNTER — HOSPITAL ENCOUNTER (OUTPATIENT)
Dept: RADIOLOGY | Facility: HOSPITAL | Age: 80
Discharge: HOME | End: 2025-04-30
Payer: MEDICARE

## 2025-04-30 DIAGNOSIS — R26.9 GAIT ABNORMALITY: ICD-10-CM

## 2025-04-30 DIAGNOSIS — R29.898 DECREASED STRENGTH INVOLVING KNEE JOINT: ICD-10-CM

## 2025-04-30 PROCEDURE — 78315 BONE IMAGING 3 PHASE: CPT

## 2025-04-30 PROCEDURE — A9503 TC99M MEDRONATE: HCPCS | Performed by: RADIOLOGY

## 2025-04-30 PROCEDURE — 3430000001 HC RX 343 DIAGNOSTIC RADIOPHARMACEUTICALS: Performed by: RADIOLOGY

## 2025-04-30 RX ADMIN — TECHNETIUM TC 99M MEDRONATE 24.12 MILLICURIE: 25 INJECTION, POWDER, FOR SOLUTION INTRAVENOUS at 10:00

## 2025-05-01 ENCOUNTER — TREATMENT (OUTPATIENT)
Dept: PHYSICAL THERAPY | Facility: CLINIC | Age: 80
End: 2025-05-01
Payer: MEDICARE

## 2025-05-01 DIAGNOSIS — R26.9 GAIT ABNORMALITY: ICD-10-CM

## 2025-05-01 DIAGNOSIS — R29.898 DECREASED STRENGTH INVOLVING KNEE JOINT: Primary | ICD-10-CM

## 2025-05-01 PROCEDURE — 97110 THERAPEUTIC EXERCISES: CPT | Mod: GP,CQ | Performed by: PHYSICAL THERAPY ASSISTANT

## 2025-05-01 ASSESSMENT — PAIN SCALES - GENERAL: PAINLEVEL_OUTOF10: 0 - NO PAIN

## 2025-05-01 ASSESSMENT — PAIN - FUNCTIONAL ASSESSMENT: PAIN_FUNCTIONAL_ASSESSMENT: 0-10

## 2025-05-01 NOTE — PROGRESS NOTES
"Physical Therapy    Physical Therapy Treatment    Patient Name: Yaneth Alvarez  MRN: [id number]  : 1945  Today's Date: 2025  Time Calculation  Start Time: 1145  Stop Time: 1240  Time Calculation (min): 55 min    PT Therapeutic Procedures Time Entry  Therapeutic Exercise Time Entry: 45  PT Modalities Time Entry  Hot/Cold Pack Time Entry: 10       VISIT:# 3  Approved 7 visits  25 ~ 25  Current Problem  Problem List Items Addressed This Visit           ICD-10-CM    Decreased strength involving knee joint - Primary R29.898    Gait abnormality R26.9        Subjective    Patient reports no knee pain this date.  Patient reports she had bone scan and states that the edema in her legs is due to inflammation that will not go away.      Pain  Pain Assessment: 0-10  0-10 (Numeric) Pain Score: 0 - No pain  Pain Location: Knee  Pain Orientation: Left       Objective          Lower extremity strength:  Knee Extension 4+/5 3+/5 pain   Knee Flexion 4+/5 4/5          Precautions  Precautions  STEADI Fall Risk Score (The score of 4 or more indicates an increased risk of falling): 2      Treatments:     XERCISES Date 25 Date 25 Date 25 Date      Visit #1/7 Visit #2/                Nustep L3  6 mins  L3 x 10 min   L3 x 10 min     Bike                       Shuttle  DLP 4B  3 X 10  5B 3 x 10  5B 3 x 10     Shuttle SLP 3B  3 X 10  4B 3 x 10   4B 3 x 10      Shuttle TR/HR    4B 3 x 10  4B 3 x 10                  Qhip Flexion    40# 2 x 10  40# 2 x 10     Qhip Extension    40# 2 x 10  40# 2 x 10     Qhip Abduction    30# 2 x 10  30# 2 x 10     Qhip  TKE      60# 5\"H x 20                 Q Quad    20# 2x 10, 1 x 5  20# 2x 10, 1 x 5     Q Hamstring     35# 3 x 10   35# 3 x 10                  Step ups     6\" x 10 each  6\" x 10 each                                                                  HS stretch      10\" x 5 each                                                     CP    10' Ramez  10' Ramez        "                                 Assessment:   Patient reports pain in both knees with HS stretch ar 4/10, otherwise no reports of pain with all exercise.        Plan:    Assess response to initial session and progress LE strengthening program to improve transfers/gait.     Goals: STG's  Improve left LE strength so that patient can perform sit to stand without using Ue's-4 weeks  Pt will be able to perform sit to stand without left knee pain-4 weeks     LTG's  Pt will be able to walk community distances without knee pain-8 weeks  Pt will ambulate without deviation or compensation-8 weeks  Pt. Will be able to ascend/descend stairs with reciprocal pattern and no compensation-8 weeks  Improve LEFS score > 45 to faciliate return to prior level of function-8 weeks

## 2025-05-06 ENCOUNTER — APPOINTMENT (OUTPATIENT)
Dept: RADIOLOGY | Facility: HOSPITAL | Age: 80
End: 2025-05-06
Payer: MEDICARE

## 2025-05-07 ENCOUNTER — TREATMENT (OUTPATIENT)
Dept: PHYSICAL THERAPY | Facility: CLINIC | Age: 80
End: 2025-05-07
Payer: MEDICARE

## 2025-05-07 DIAGNOSIS — R26.9 GAIT ABNORMALITY: ICD-10-CM

## 2025-05-07 DIAGNOSIS — R29.898 DECREASED STRENGTH INVOLVING KNEE JOINT: Primary | ICD-10-CM

## 2025-05-07 PROCEDURE — 97110 THERAPEUTIC EXERCISES: CPT | Mod: GP,CQ

## 2025-05-07 ASSESSMENT — PAIN DESCRIPTION - DESCRIPTORS: DESCRIPTORS: ACHING;TIGHTNESS

## 2025-05-07 ASSESSMENT — PAIN SCALES - GENERAL: PAINLEVEL_OUTOF10: 1

## 2025-05-07 ASSESSMENT — PAIN - FUNCTIONAL ASSESSMENT: PAIN_FUNCTIONAL_ASSESSMENT: 0-10

## 2025-05-07 NOTE — PROGRESS NOTES
"Physical Therapy    Physical Therapy Treatment    Patient Name: Yaneth Alvarez  MRN: [id number]  : 1945  Today's Date: 2025  Time Calculation  Start Time: 1015  Stop Time: 1105  Time Calculation (min): 50 min    PT Therapeutic Procedures Time Entry  Therapeutic Exercise Time Entry: 40  PT Modalities Time Entry  Hot/Cold Pack Time Entry: 10 (Cold pack after treatment)  Non-Billable Time  Non-billable time: 10  Non-billable time reason: Cold pack after treatment    VISIT:# 4  3/7  Approved 7 visits  25 ~ 25    Current Problem  Problem List Items Addressed This Visit           ICD-10-CM    Decreased strength involving knee joint - Primary R29.898    Gait abnormality R26.9        Subjective    Patient reports knees feel a little stiff today however overall better today.        Pain  Pain Assessment: 0-10  0-10 (Numeric) Pain Score: 1  Pain Type: Chronic pain  Pain Location: Knee  Pain Orientation: Left  Pain Descriptors: Aching, Tightness       Objective   Other Measures  Lower Extremity Funtional Score (LEFS): 27 (Score at initial evaluation)     Increased resistance/repetitions to majority of exercises. See exercise log for specifics.     Patient requires upper extremity support with performing step up exercises forward and lateral.       Precautions  Precautions  Precautions Comment: None      Treatments:     XERCISES Date 25 Date 25 Date 25 Date  2025      Visit #1 Visit #2/ Visit  3/7               Nustep L3  6 mins  L3 x 10 min   L3 x 10 min  L4 10 minutes   Bike                       Shuttle  DLP 4B  3 X 10  5B 3 x 10  5B 3 x 10  5B 3 x 15   Shuttle SLP 3B  3 X 10  4B 3 x 10   4B 3 x 10   4B 3 x 15   Shuttle TR/HR    4B 3 x 10  4B 3 x 10   5B 3 x 15               Qhip Flexion    40# 2 x 10  40# 2 x 10  40# 2 x 10   Qhip Extension    40# 2 x 10  40# 2 x 10  40# 2 x 10   Qhip Abduction    30# 2 x 10  30# 2 x 10  40# 2 x 10   Qhip  TKE      60# 5\"H x 20  60# x 20        " "       Q Quad    20# 2x 10, 1 x 5  20# 2x 10, 1 x 5  20# 3 x 12   Q Hamstring     35# 3 x 10   35# 3 x 10   35# 3 x 12               Step ups     6\" x 10 each  6\" x 10 each  8 inch x 15 each    Lateral step up        8 inch x 15 each                                                    HS stretch      10\" x 5 each 10\"H x 5 each                                                    CP    10' Ramez  10' Ramez 10 minutes ramez                                       Assessment:   Patient performed added and tolerated increases in exercise program without complaints of increased bilateral knee or lower extremity symptoms. Patient reports decreased retropatellar symptoms with sit to stand and with initial walking activities (LTG #1 addressed). Patient indicates and demonstrates compliance with home exercise program.        Plan:    Continue with strengthening and endurance program progressing as tolerated to improve patient's standing and walking tolerance.     Patient scheduled for a reassessment on 05/19/2025.    Goals: STG's  Improve left LE strength so that patient can perform sit to stand without using Ue's-4 weeks  Pt will be able to perform sit to stand without left knee pain-4 weeks     LTG's  Pt will be able to walk community distances without knee pain-8 weeks  Pt will ambulate without deviation or compensation-8 weeks  Pt. Will be able to ascend/descend stairs with reciprocal pattern and no compensation-8 weeks  Improve LEFS score > 45 to faciliate return to prior level of function-8 weeks     "

## 2025-05-09 ENCOUNTER — TREATMENT (OUTPATIENT)
Dept: PHYSICAL THERAPY | Facility: CLINIC | Age: 80
End: 2025-05-09
Payer: MEDICARE

## 2025-05-09 DIAGNOSIS — R29.898 DECREASED STRENGTH INVOLVING KNEE JOINT: Primary | ICD-10-CM

## 2025-05-09 DIAGNOSIS — R26.9 GAIT ABNORMALITY: ICD-10-CM

## 2025-05-09 PROCEDURE — 97110 THERAPEUTIC EXERCISES: CPT | Mod: GP,CQ

## 2025-05-09 ASSESSMENT — PAIN - FUNCTIONAL ASSESSMENT: PAIN_FUNCTIONAL_ASSESSMENT: 0-10

## 2025-05-09 ASSESSMENT — PAIN SCALES - GENERAL: PAINLEVEL_OUTOF10: 1

## 2025-05-09 ASSESSMENT — PAIN DESCRIPTION - DESCRIPTORS: DESCRIPTORS: ACHING;TIGHTNESS

## 2025-05-09 NOTE — PROGRESS NOTES
Physical Therapy    Physical Therapy Treatment    Patient Name: Yaneth Alvarez  MRN: [id number]  : 1945  Today's Date: 2025  Time Calculation  Start Time: 1015  Stop Time: 1115  Time Calculation (min): 60 min    PT Therapeutic Procedures Time Entry  Therapeutic Exercise Time Entry: 45  PT Modalities Time Entry  Hot/Cold Pack Time Entry: 10 (Cold pack after treatment)  Non-Billable Time  Non-billable time: 10  Non-billable time reason: Cold pack after treatment      VISIT:# 5    Approved 7 visits  25 ~ 25    Current Problem  Problem List Items Addressed This Visit           ICD-10-CM    Decreased strength involving knee joint - Primary R29.898    Gait abnormality R26.9        Subjective    Patient reports no complaints of increased bilateral knee or lower extremity symptoms with daily activities after last treatment. Patient indicates continues to experience difficulty ascending and descending a flight of stairs however feels it is more due to back pain vs knee symptoms.        Pain  Pain Assessment: 0-10  0-10 (Numeric) Pain Score: 1  Pain Type: Chronic pain  Pain Location: Knee  Pain Orientation: Left  Pain Descriptors: Aching, Tightness       Objective   Other Measures  Lower Extremity Funtional Score (LEFS): 27 (Score at initial evaluation)     Increased exercises. See exercise log for specifics.     Right knee strength  Quads = 4+/5  Hamstrings = 5/5    Left knee strength   Quads = 4/5  Hamstrings = 4+/5      Precautions  Precautions  Precautions Comment: None      Treatments:     XERCISES Date 25 Date 25 Date 25 Date  2025 Date  2025      Visit #1/ Visit #2 Visit  3 Visit                 Nustep L3  6 mins  L3 x 10 min   L3 x 10 min  L4 10 minutes L4  10 minutes   Bike                         Shuttle  DLP 4B  3 X 10  5B 3 x 10  5B 3 x 10  5B 3 x 15 5B 3 x 20   Shuttle SLP 3B  3 X 10  4B 3 x 10   4B 3 x 10   4B 3 x 15 4B 3 x 20   Shuttle TR/HR    4B 3 x  "10  4B 3 x 10   5B 3 x 15 5B 3 x 20                Qhip Flexion    40# 2 x 10  40# 2 x 10  40# 2 x 10 40# x 20   Qhip Extension    40# 2 x 10  40# 2 x 10  40# 2 x 10 40# x 20   Qhip Abduction    30# 2 x 10  30# 2 x 10  40# 2 x 10 40# x 20   Qhip  TKE      60# 5\"H x 20  60# x 20 60# x 20                Q Quad    20# 2x 10, 1 x 5  20# 2x 10, 1 x 5  20# 3 x 12 20# 3 x 12   Q Hamstring     35# 3 x 10   35# 3 x 10   35# 3 x 12 40# 3 x 15                Step ups     6\" x 10 each  6\" x 10 each  8 inch x 15 each 8 inch x 20 each    Lateral step up        8 inch x 15 each 8 inch x 20 each                 Back extension         60# 3 x 20                 Stairs (4)         5x    HS stretch      10\" x 5 each 10\"H x 5 each  10\"H x 5 each                                                       CP    10' Ramez  10' Ramez 10 minutes ramez  10 minutes ramez                                       Assessment:   Patient tolerated increases in exercise program without complaints of increased bilateral knee or lower extremity symptoms. Patient was able to ascend and descend stairs with a reciprocating pattern with support of 1 hand rail without compensations or increases in lumbar or bilateral knee/lower extremity symptoms (LTG #3 addressed). Patient presented with improved bilateral knee/lower extremity strength measures since last recorded measurements (STG #1 addressed).       Plan:    Continue with strengthening and endurance program progressing as tolerated to improve patient's standing and walking tolerance.     Patient scheduled for a reassessment on 05/19/2025.    Goals: STG's  Improve left LE strength so that patient can perform sit to stand without using Ue's-4 weeks  Pt will be able to perform sit to stand without left knee pain-4 weeks     LTG's  Pt will be able to walk community distances without knee pain-8 weeks  Pt will ambulate without deviation or compensation-8 weeks  Pt. Will be able to ascend/descend stairs with reciprocal " pattern and no compensation-8 weeks  Improve LEFS score > 45 to faciliate return to prior level of function-8 weeks

## 2025-05-13 ENCOUNTER — TREATMENT (OUTPATIENT)
Dept: PHYSICAL THERAPY | Facility: CLINIC | Age: 80
End: 2025-05-13
Payer: MEDICARE

## 2025-05-13 DIAGNOSIS — R29.898 DECREASED STRENGTH INVOLVING KNEE JOINT: Primary | ICD-10-CM

## 2025-05-13 DIAGNOSIS — R26.9 GAIT ABNORMALITY: ICD-10-CM

## 2025-05-13 PROCEDURE — 97110 THERAPEUTIC EXERCISES: CPT | Mod: GP,CQ

## 2025-05-13 ASSESSMENT — PAIN SCALES - GENERAL: PAINLEVEL_OUTOF10: 2

## 2025-05-13 ASSESSMENT — PAIN - FUNCTIONAL ASSESSMENT: PAIN_FUNCTIONAL_ASSESSMENT: 0-10

## 2025-05-13 ASSESSMENT — PAIN DESCRIPTION - DESCRIPTORS: DESCRIPTORS: ACHING;SORE

## 2025-05-13 NOTE — PROGRESS NOTES
"Physical Therapy    Physical Therapy Treatment    Patient Name: Yaneth Alvarez  MRN: [id number]  : 1945  Today's Date: 2025  Time Calculation  Start Time: 1010  Stop Time: 1105  Time Calculation (min): 55 min  PT Therapeutic Procedures Time Entry  Therapeutic Exercise Time Entry: 43  PT Modalities Time Entry  Hot/Cold Pack Time Entry: 10 (Cold pack after treatment)  Non-Billable Time  Non-billable time: 10  Non-billable time reason: Cold pack after treatment      VISIT:# 6    Approved 7 visits  25 ~ 25    Current Problem  Problem List Items Addressed This Visit           ICD-10-CM    Decreased strength involving knee joint - Primary R29.898    Gait abnormality R26.9        Subjective    Patient reports knees are sore and achy today and feels it is due to rainy weather.      Pain  Pain Assessment: 0-10  0-10 (Numeric) Pain Score: 2  Pain Type: Chronic pain  Pain Location: Knee  Pain Orientation: Left, Right  Pain Descriptors: Aching, Sore       Objective   Other Measures  Lower Extremity Funtional Score (LEFS): 27 (Score at initial evaluation)       Sit to stand and stand to sit transfers  No compensations noted.      Precautions  Precautions  Precautions Comment: None      Treatments:     EXERCISES Date  2025 Date  2025     Visit  Visit           Nustep L4  10 minutes L4 10 minutes   Bike           Shuttle  DLP 5B 3 x 20 5B 3 x 20   Shuttle SLP 4B 3 x 20 4B 3 x 20   Shuttle TR/HR 5B 3 x 20 5B 3 x 20         Qhip Flexion 40# x 20 40# x 20   Qhip Extension 40# x 20 40# x 20   Qhip Abduction 40# x 20 40# x 20   Qhip  TKE 60# x 20 60# x 20         Q Quad 20# 3 x 12 20# 3 x 12   Q Hamstring  40# 3 x 15 40# 3 x 15         Step ups  8 inch x 20 each 8 inch x 20 each    Lateral step up 8 inch x 20 each 8 inch x 20 each          Back extension 60# 3 x 20 60# 3 x 20          Stairs (4) 5x     HS stretch 10\"H x 5 each 10\"H x 5 each                           CP 10 minutes kourtney 10 " minutes bilateral                                Assessment:   Patient tolerated increases in exercise program without complaints of increased bilateral knee or lower extremity symptoms. Patient continues to require use of 1 hand rail to ascend and descend stairs with a reciprocating pattern (LTG #3 addressed).        Plan:    Continue with strengthening and endurance program progressing as tolerated to improve patient's standing and walking tolerance.     Patient scheduled for a reassessment on 05/19/2025.    Goals: STG's  Improve left LE strength so that patient can perform sit to stand without using Ue's-4 weeks  Pt will be able to perform sit to stand without left knee pain-4 weeks     LTG's  Pt will be able to walk community distances without knee pain-8 weeks  Pt will ambulate without deviation or compensation-8 weeks  Pt. Will be able to ascend/descend stairs with reciprocal pattern and no compensation-8 weeks  Improve LEFS score > 45 to faciliate return to prior level of function-8 weeks

## 2025-05-15 ENCOUNTER — TREATMENT (OUTPATIENT)
Dept: PHYSICAL THERAPY | Facility: CLINIC | Age: 80
End: 2025-05-15
Payer: MEDICARE

## 2025-05-15 DIAGNOSIS — R29.898 DECREASED STRENGTH INVOLVING KNEE JOINT: Primary | ICD-10-CM

## 2025-05-15 DIAGNOSIS — R26.9 GAIT ABNORMALITY: ICD-10-CM

## 2025-05-15 PROCEDURE — 97110 THERAPEUTIC EXERCISES: CPT | Mod: GP,CQ

## 2025-05-15 ASSESSMENT — PAIN - FUNCTIONAL ASSESSMENT: PAIN_FUNCTIONAL_ASSESSMENT: 0-10

## 2025-05-15 ASSESSMENT — PAIN SCALES - GENERAL: PAINLEVEL_OUTOF10: 1

## 2025-05-15 ASSESSMENT — PAIN DESCRIPTION - DESCRIPTORS: DESCRIPTORS: ACHING;DULL

## 2025-05-15 NOTE — PROGRESS NOTES
Physical Therapy    Physical Therapy Treatment    Patient Name: Yaneth Alvarez  MRN: [id number]  : 1945  Today's Date: 5/15/2025  Time Calculation  Start Time: 1015  Stop Time: 1110  Time Calculation (min): 55 min  PT Therapeutic Procedures Time Entry  Therapeutic Exercise Time Entry: 44  PT Modalities Time Entry  Hot/Cold Pack Time Entry: 10 (Cold pack after treatment)  Non-Billable Time  Non-billable time: 10  Non-billable time reason: Cold pack after treatment      VISIT:# 7    Approved 7 visits  25 ~ 25    Current Problem  Problem List Items Addressed This Visit           ICD-10-CM    Decreased strength involving knee joint - Primary R29.898    Gait abnormality R26.9        Subjective    Patient reports knees are sore and achy today and feels it is due to rainy weather.      Pain  Pain Assessment: 0-10  0-10 (Numeric) Pain Score: 1  Pain Type: Chronic pain  Pain Location: Knee  Pain Orientation: Right, Left  Pain Descriptors: Aching, Dull       Objective   Other Measures  Lower Extremity Funtional Score (LEFS): 27 (Score at initial evaluation)       Right knee strength  Quads = 4+/5  Hamstrings = 5/5    Left knee strength   Quads = 4+/5  Hamstrings = 5/5      Precautions  Precautions  Precautions Comment: None      Treatments:     EXERCISES Date  2025 Date  2025 Date  05/15/2025     Visit  Visit   Visit            Nustep L4  10 minutes L4 10 minutes L4 10 minutes   Bike             Shuttle  DLP 5B 3 x 20 5B 3 x 20 5B 3 x 20   Shuttle SLP 4B 3 x 20 4B 3 x 20 4B 3 x 20   Shuttle TR/HR 5B 3 x 20 5B 3 x 20 5B 3 x 20          Qhip Flexion 40# x 20 40# x 20 40# x 20   Qhip Extension 40# x 20 40# x 20 40# x 20   Qhip Abduction 40# x 20 40# x 20 40# x 20   Qhip  TKE 60# x 20 60# x 20 70# x 20          Q Quad 20# 3 x 12 20# 3 x 12 25# 3 x 15   Q Hamstring  40# 3 x 15 40# 3 x 15 45# 3 x 15          Step ups  8 inch x 20 each 8 inch x 20 each 8 inch x 20    Lateral step up 8 inch  "x 20 each 8 inch x 20 each 8 inch x 20           Back extension 60# 3 x 20 60# 3 x 20 60# 3 x 20           Stairs (4) 5x      HS stretch 10\"H x 5 each 10\"H x 5 each 10\"H x 5 each                               CP 10 minutes kourtney 10 minutes bilateral 10 minutes bilateral                                 Assessment:   Patient performed exercises without complaints of increased bilateral knee or lower extremity symptoms. Patient continues to require use of at least 1 hand rail to ascend and descend stairs with a reciprocating pattern (LTG #3 addressed). Patient indicates is able to perform 1-2 steps without handrail. Patient reports overall decreased difficulty and bilateral retropatellar symptoms with sit to stand transfers from chairs or sofa.      Plan:    Continue with strengthening and endurance program progressing as tolerated to improve patient's standing and walking tolerance.     Patient scheduled for a reassessment on 05/19/2025.    Goals: STG's  Improve left LE strength so that patient can perform sit to stand without using Ue's-4 weeks  Pt will be able to perform sit to stand without left knee pain-4 weeks     LTG's  Pt will be able to walk community distances without knee pain-8 weeks  Pt will ambulate without deviation or compensation-8 weeks  Pt. Will be able to ascend/descend stairs with reciprocal pattern and no compensation-8 weeks  Improve LEFS score > 45 to faciliate return to prior level of function-8 weeks     "

## 2025-05-19 ENCOUNTER — TREATMENT (OUTPATIENT)
Dept: PHYSICAL THERAPY | Facility: CLINIC | Age: 80
End: 2025-05-19
Payer: MEDICARE

## 2025-05-19 DIAGNOSIS — R26.9 GAIT ABNORMALITY: ICD-10-CM

## 2025-05-19 DIAGNOSIS — R29.898 DECREASED STRENGTH INVOLVING KNEE JOINT: Primary | ICD-10-CM

## 2025-05-19 PROCEDURE — 97110 THERAPEUTIC EXERCISES: CPT | Mod: GP | Performed by: PHYSICAL THERAPIST

## 2025-05-19 ASSESSMENT — PAIN SCALES - GENERAL: PAINLEVEL_OUTOF10: 2

## 2025-05-19 ASSESSMENT — PAIN - FUNCTIONAL ASSESSMENT: PAIN_FUNCTIONAL_ASSESSMENT: 0-10

## 2025-05-19 NOTE — PROGRESS NOTES
Physical Therapy    Physical Therapy Treatment/Reassessment    Patient Name: Yaneth Alvarez  MRN: [id number]  : 1945  Today's Date: 2025  Time Calculation  Start Time: 1015  Stop Time: 1105  Time Calculation (min): 50 min  PT Therapeutic Procedures Time Entry  Therapeutic Exercise Time Entry: 40     Non-Billable Time  Non-billable time: 10  Non-billable time reason: Recheck      VISIT:# 8    Approved 7 visits + 1 eval  25 ~ 25    Current Problem  Problem List Items Addressed This Visit           ICD-10-CM    Decreased strength involving knee joint - Primary R29.898    Gait abnormality R26.9        Subjective    Patient reports that both her knees feel better than when she started PT, but she continues to feel pressure in both knees.     Pain  Pain Assessment: 0-10  0-10 (Numeric) Pain Score: 2  Pain Location: Knee  Pain Orientation: Right, Left       Objective   Other Measures  Lower Extremity Funtional Score (LEFS): 43       Right knee strength  Quads = 5/5  Hamstrings = 5/5    Left knee strength   Quads = 5/5  Hamstrings = 5/5    Sit to stand: independent with no UE support needed    Gait: Pt ambulates without deviation or device.    Stairs: Pt ascends/descends stairs with reciprocal pattern and one hand rail    Functional Outcome Measure: LEFS 43   Precautions  Precautions  Precautions Comment: none      Treatments:     EXERCISES Date  2025 Date  2025 Date  05/15/2025 5/19/25     Visit  Visit   Visit              Nustep L4  10 minutes L4 10 minutes L4 10 minutes L4  10 mins   Bike               Shuttle  DLP 5B 3 x 20 5B 3 x 20 5B 3 x 20 5B  3 X 20   Shuttle SLP 4B 3 x 20 4B 3 x 20 4B 3 x 20 4B  3 X 20   Shuttle TR/HR 5B 3 x 20 5B 3 x 20 5B 3 x 20 5B  3 X 20           Qhip Flexion 40# x 20 40# x 20 40# x 20 40#  X 20   Qhip Extension 40# x 20 40# x 20 40# x 20 40#  X 20   Qhip Abduction 40# x 20 40# x 20 40# x 20 40#  X 20   Qhip  TKE 60# x 20 60# x 20 70# x 20 70#  "X 20           Q Quad 20# 3 x 12 20# 3 x 12 25# 3 x 15 25# 3 X 15   Q Hamstring  40# 3 x 15 40# 3 x 15 45# 3 x 15 45#  3 X 15           Step ups  8 inch x 20 each 8 inch x 20 each 8 inch x 20     Lateral step up 8 inch x 20 each 8 inch x 20 each 8 inch x 20             Back extension 60# 3 x 20 60# 3 x 20 60# 3 x 20 60#  3 X 20            Stairs (4) 5x       HS stretch 10\"H x 5 each 10\"H x 5 each 10\"H x 5 each                                    CP 10 minutes kourtney 10 minutes bilateral 10 minutes bilateral                    Assessment:  LEFS score improved from 27/80 at evaluation to 43/100 today. LE strength has improved significantly since starting PT. Pt requests to hold PT at this time and will work on program at [facility]. If pain returns, patient will return to PT.    Plan:    Continue with strengthening and endurance program progressing as tolerated to improve patient's standing and walking tolerance.     Patient scheduled for a reassessment on 05/19/2025.    Goals: STG's  Improve left LE strength so that patient can perform sit to stand without using Ue's-4 weeks-GOAL MET  Pt will be able to perform sit to stand without left knee pain-4 weeks-GOAL MET     LTG's  Pt will be able to walk community distances without knee pain-8 weeks-GOAL MET  Pt will ambulate without deviation or compensation-8 weeks-GOAL MET  Pt. Will be able to ascend/descend stairs with reciprocal pattern and no compensation-8 weeks-GOAL MET  Improve LEFS score > 45 to faciliate return to prior level of function-8 weeks-5/19/25 PROGRESSING    [facility] RECHECK AUTHORIZATION FORM    CPT CODES:  THERE-EX  (11900), MANUAL (90851), and GAIT (97050)  Functional outcome tools: LEFS  Raw score: 43  Is this request to provide: None of the above  Evaluation complextiy: Low  Is the patient pregnant now or has the patient delivered in the past six months? : No  Which of the following best describes the primary purpose of therapy?: " Rehabilitation-Improving, restoring, or adapting functional mobility or skills  Did the patient have a surgical procedure in the last three months related to the condition for which services are being requested: No  Select all conditions that apply: Unknown  Select the primary mitigating factor which impeded progress: None of these apply  Was the treatment plan revised through this episode of care?: No  How many visits have been completed under the new treatment plan?: Greater than or equal to four  Short and long term goals  2  2  4  3  progress towards any of the goals established in the plan of care?: yes

## 2025-05-21 ENCOUNTER — APPOINTMENT (OUTPATIENT)
Dept: PHYSICAL THERAPY | Facility: CLINIC | Age: 80
End: 2025-05-21
Payer: MEDICARE

## 2025-05-21 DIAGNOSIS — R26.9 GAIT ABNORMALITY: ICD-10-CM

## 2025-05-21 DIAGNOSIS — R29.898 DECREASED STRENGTH INVOLVING KNEE JOINT: Primary | ICD-10-CM

## 2025-06-11 ENCOUNTER — TELEPHONE (OUTPATIENT)
Dept: ENDOCRINOLOGY | Facility: CLINIC | Age: 80
End: 2025-06-11

## 2025-06-11 NOTE — TELEPHONE ENCOUNTER
(Next appt 10/29/2025)    Patient asked to have levels check due to weight loss, expected date for current req is for 10/2025

## 2025-08-14 DIAGNOSIS — E03.9 HYPOTHYROIDISM, ADULT: ICD-10-CM

## 2025-08-14 RX ORDER — LEVOTHYROXINE SODIUM 25 UG/1
TABLET ORAL
Qty: 108 TABLET | Refills: 1 | Status: SHIPPED | OUTPATIENT
Start: 2025-08-14

## 2025-10-29 ENCOUNTER — APPOINTMENT (OUTPATIENT)
Dept: ENDOCRINOLOGY | Facility: CLINIC | Age: 80
End: 2025-10-29
Payer: MEDICARE